# Patient Record
Sex: FEMALE | Race: WHITE | NOT HISPANIC OR LATINO | URBAN - METROPOLITAN AREA
[De-identification: names, ages, dates, MRNs, and addresses within clinical notes are randomized per-mention and may not be internally consistent; named-entity substitution may affect disease eponyms.]

---

## 2018-04-23 ENCOUNTER — INPATIENT (INPATIENT)
Facility: HOSPITAL | Age: 83
LOS: 9 days | Discharge: HOME | End: 2018-05-03
Attending: HOSPITALIST | Admitting: HOSPITALIST

## 2018-04-23 VITALS — HEART RATE: 98 BPM | RESPIRATION RATE: 19 BRPM | SYSTOLIC BLOOD PRESSURE: 66 MMHG | DIASTOLIC BLOOD PRESSURE: 43 MMHG

## 2018-04-23 LAB
ANION GAP SERPL CALC-SCNC: 22 MMOL/L — HIGH (ref 7–14)
APPEARANCE UR: (no result)
APTT BLD: 21.7 SEC — CRITICAL LOW (ref 27–39.2)
BASOPHILS # BLD AUTO: 0.01 K/UL — SIGNIFICANT CHANGE UP (ref 0–0.2)
BASOPHILS NFR BLD AUTO: 0.1 % — SIGNIFICANT CHANGE UP (ref 0–1)
BILIRUB DIRECT SERPL-MCNC: 0.3 MG/DL — HIGH (ref 0–0.2)
BILIRUB INDIRECT FLD-MCNC: 0.9 MG/DL — SIGNIFICANT CHANGE UP (ref 0.2–1.2)
BILIRUB SERPL-MCNC: 1.2 MG/DL — SIGNIFICANT CHANGE UP (ref 0.2–1.2)
BILIRUB UR-MCNC: NEGATIVE — SIGNIFICANT CHANGE UP
BUN SERPL-MCNC: 50 MG/DL — HIGH (ref 10–20)
CALCIUM SERPL-MCNC: 9.2 MG/DL — SIGNIFICANT CHANGE UP (ref 8.5–10.1)
CHLORIDE SERPL-SCNC: 101 MMOL/L — SIGNIFICANT CHANGE UP (ref 98–110)
CK SERPL-CCNC: 2478 U/L — HIGH (ref 0–225)
CO2 SERPL-SCNC: 20 MMOL/L — SIGNIFICANT CHANGE UP (ref 17–32)
COLOR SPEC: YELLOW — SIGNIFICANT CHANGE UP
CREAT SERPL-MCNC: 1 MG/DL — SIGNIFICANT CHANGE UP (ref 0.7–1.5)
DIFF PNL FLD: (no result)
EOSINOPHIL # BLD AUTO: 0 K/UL — SIGNIFICANT CHANGE UP (ref 0–0.7)
EOSINOPHIL NFR BLD AUTO: 0 % — SIGNIFICANT CHANGE UP (ref 0–8)
GAS PNL BLDV: SIGNIFICANT CHANGE UP
GLUCOSE SERPL-MCNC: 168 MG/DL — HIGH (ref 70–99)
GLUCOSE UR QL: NEGATIVE MG/DL — SIGNIFICANT CHANGE UP
HCT VFR BLD CALC: 31.9 % — LOW (ref 37–47)
HGB BLD-MCNC: 10.4 G/DL — LOW (ref 12–16)
IMM GRANULOCYTES NFR BLD AUTO: 0.7 % — HIGH (ref 0.1–0.3)
INR BLD: 1.34 RATIO — HIGH (ref 0.65–1.3)
KETONES UR-MCNC: 15
LEUKOCYTE ESTERASE UR-ACNC: (no result)
LIDOCAIN IGE QN: 7 U/L — SIGNIFICANT CHANGE UP (ref 7–60)
LYMPHOCYTES # BLD AUTO: 1.36 K/UL — SIGNIFICANT CHANGE UP (ref 1.2–3.4)
LYMPHOCYTES # BLD AUTO: 8.8 % — LOW (ref 20.5–51.1)
MAGNESIUM SERPL-MCNC: 2.5 MG/DL — HIGH (ref 1.8–2.4)
MCHC RBC-ENTMCNC: 30.1 PG — SIGNIFICANT CHANGE UP (ref 27–31)
MCHC RBC-ENTMCNC: 32.6 G/DL — SIGNIFICANT CHANGE UP (ref 32–37)
MCV RBC AUTO: 92.5 FL — SIGNIFICANT CHANGE UP (ref 81–99)
MONOCYTES # BLD AUTO: 1.03 K/UL — HIGH (ref 0.1–0.6)
MONOCYTES NFR BLD AUTO: 6.6 % — SIGNIFICANT CHANGE UP (ref 1.7–9.3)
NEUTROPHILS # BLD AUTO: 13 K/UL — HIGH (ref 1.4–6.5)
NEUTROPHILS NFR BLD AUTO: 83.8 % — HIGH (ref 42.2–75.2)
NITRITE UR-MCNC: POSITIVE
NT-PROBNP SERPL-SCNC: 8476 PG/ML — HIGH (ref 0–300)
PH UR: 6 — SIGNIFICANT CHANGE UP (ref 5–8)
PLATELET # BLD AUTO: 271 K/UL — SIGNIFICANT CHANGE UP (ref 130–400)
POTASSIUM SERPL-MCNC: 5.5 MMOL/L — HIGH (ref 3.5–5)
POTASSIUM SERPL-SCNC: 5.5 MMOL/L — HIGH (ref 3.5–5)
PROT UR-MCNC: 30 MG/DL
PROTHROM AB SERPL-ACNC: 14.6 SEC — HIGH (ref 9.95–12.87)
RBC # BLD: 3.45 M/UL — LOW (ref 4.2–5.4)
RBC # FLD: 14.6 % — HIGH (ref 11.5–14.5)
SODIUM SERPL-SCNC: 143 MMOL/L — SIGNIFICANT CHANGE UP (ref 135–146)
SP GR SPEC: 1.01 — SIGNIFICANT CHANGE UP (ref 1.01–1.03)
TROPONIN T SERPL-MCNC: <0.01 NG/ML — SIGNIFICANT CHANGE UP
TYPE + AB SCN PNL BLD: SIGNIFICANT CHANGE UP
UROBILINOGEN FLD QL: 0.2 MG/DL — SIGNIFICANT CHANGE UP (ref 0.2–0.2)
WBC # BLD: 15.51 K/UL — HIGH (ref 4.8–10.8)
WBC # FLD AUTO: 15.51 K/UL — HIGH (ref 4.8–10.8)

## 2018-04-23 RX ORDER — MAGNESIUM SULFATE 500 MG/ML
2 VIAL (ML) INJECTION ONCE
Qty: 0 | Refills: 0 | Status: COMPLETED | OUTPATIENT
Start: 2018-04-23 | End: 2018-04-23

## 2018-04-23 RX ORDER — NOREPINEPHRINE BITARTRATE/D5W 8 MG/250ML
0.05 PLASTIC BAG, INJECTION (ML) INTRAVENOUS
Qty: 8 | Refills: 0 | Status: DISCONTINUED | OUTPATIENT
Start: 2018-04-23 | End: 2018-04-24

## 2018-04-23 RX ORDER — SODIUM CHLORIDE 9 MG/ML
1000 INJECTION, SOLUTION INTRAVENOUS ONCE
Qty: 0 | Refills: 0 | Status: COMPLETED | OUTPATIENT
Start: 2018-04-23 | End: 2018-04-23

## 2018-04-23 RX ORDER — CEFTRIAXONE 500 MG/1
1 INJECTION, POWDER, FOR SOLUTION INTRAMUSCULAR; INTRAVENOUS ONCE
Qty: 0 | Refills: 0 | Status: COMPLETED | OUTPATIENT
Start: 2018-04-23 | End: 2018-04-23

## 2018-04-23 RX ORDER — METOPROLOL TARTRATE 50 MG
12.5 TABLET ORAL
Qty: 0 | Refills: 0 | Status: DISCONTINUED | OUTPATIENT
Start: 2018-04-23 | End: 2018-04-27

## 2018-04-23 RX ORDER — SODIUM CHLORIDE 9 MG/ML
1000 INJECTION, SOLUTION INTRAVENOUS
Qty: 0 | Refills: 0 | Status: DISCONTINUED | OUTPATIENT
Start: 2018-04-23 | End: 2018-04-25

## 2018-04-23 RX ORDER — SODIUM CHLORIDE 9 MG/ML
1000 INJECTION INTRAMUSCULAR; INTRAVENOUS; SUBCUTANEOUS ONCE
Qty: 0 | Refills: 0 | Status: COMPLETED | OUTPATIENT
Start: 2018-04-23 | End: 2018-04-23

## 2018-04-23 RX ADMIN — SODIUM CHLORIDE 6000 MILLILITER(S): 9 INJECTION, SOLUTION INTRAVENOUS at 13:36

## 2018-04-23 RX ADMIN — SODIUM CHLORIDE 100 MILLILITER(S): 9 INJECTION, SOLUTION INTRAVENOUS at 14:50

## 2018-04-23 RX ADMIN — Medication 50 GRAM(S): at 12:32

## 2018-04-23 RX ADMIN — Medication 5.62 MICROGRAM(S)/KG/MIN: at 21:25

## 2018-04-23 RX ADMIN — CEFTRIAXONE 100 GRAM(S): 500 INJECTION, POWDER, FOR SOLUTION INTRAMUSCULAR; INTRAVENOUS at 15:59

## 2018-04-23 RX ADMIN — SODIUM CHLORIDE 6000 MILLILITER(S): 9 INJECTION, SOLUTION INTRAVENOUS at 14:50

## 2018-04-23 RX ADMIN — Medication 5.62 MICROGRAM(S)/KG/MIN: at 19:32

## 2018-04-23 RX ADMIN — SODIUM CHLORIDE 1000 MILLILITER(S): 9 INJECTION, SOLUTION INTRAVENOUS at 21:15

## 2018-04-23 RX ADMIN — SODIUM CHLORIDE 2000 MILLILITER(S): 9 INJECTION INTRAMUSCULAR; INTRAVENOUS; SUBCUTANEOUS at 12:31

## 2018-04-23 RX ADMIN — Medication 5.62 MICROGRAM(S)/KG/MIN: at 14:50

## 2018-04-23 NOTE — CONSULT NOTE ADULT - SUBJECTIVE AND OBJECTIVE BOX
84 y o F presents with right hip fx. Pt was found unresponsive on the floor today, brought to ER, hypothermic, hypotensive on arrival to ER. Last time family contacted pt 3 days ago on friday- pt was well. Pt was walking with cane at baseline.     PAST MEDICAL & SURGICAL HISTORY:  HTN (hypertension)    MEDICATIONS  (STANDING):  lactated ringers Bolus 1000 milliLiter(s) IV Bolus once  lactated ringers. 1000 milliLiter(s) (100 mL/Hr) IV Continuous <Continuous>  norepinephrine Infusion 0.05 MICROgram(s)/kG/Min (5.625 mL/Hr) IV Continuous <Continuous>    MEDICATIONS  (PRN):    Allergies    No Known Allergies    Intolerances    Pt seen and examined , awake, alert    Vital Signs Last 24 Hrs  T(C): 34.9 (23 Apr 2018 20:30), Max: 34.9 (23 Apr 2018 20:30)  T(F): 94.9 (23 Apr 2018 20:30), Max: 94.9 (23 Apr 2018 20:30)  HR: 114 (23 Apr 2018 20:30) (78 - 114)  BP: 110/59 (23 Apr 2018 20:30) (66/40 - 144/85)  BP(mean): --  RR: 18 (23 Apr 2018 20:30) (18 - 19)  SpO2: 100% (23 Apr 2018 20:30) (97% - 100%)      PE- RUE externally rotated, ttp right groin, pain on leg rolling.   Right ankle, toes- motor function intact, sensation grossly intakt  Foot well perfused, + pulses                          10.4   15.51 )-----------( 271      ( 23 Apr 2018 12:08 )             31.9     04-23    143  |  101  |  50<H>  ----------------------------<  168<H>  5.5<H>   |  20  |  1.0    Ca    9.2      23 Apr 2018 12:08  Mg     2.5     04-23    TPro  x   /  Alb  x   /  TBili  1.2  /  DBili  0.3<H>  /  AST  x   /  ALT  x   /  AlkPhos  x   04-23          PT/INR - ( 23 Apr 2018 12:08 )   PT: 14.60 sec;   INR: 1.34 ratio         PTT - ( 23 Apr 2018 12:08 )  PTT:21.7 sec    < from: CT Abdomen and Pelvis w/ IV Cont (04.23.18 @ 14:31) >  BONES/SOFT TISSUES: Acute, comminuted right intratrochanteric right   femoral fracture.    < end of copied text >  < from: CT Abdomen and Pelvis w/ IV Cont (04.23.18 @ 14:31) >  BONES/SOFT TISSUES: Acute, comminuted right intratrochanteric right   femoral fracture.    < end of copied text > 84 y o F presents with right hip fx. Pt was found unresponsive on the floor today, brought to ER, hypothermic, hypotensive on arrival to ER. Last time family contacted pt 3 days ago on friday- pt was well. Pt was walking with cane at baseline.     PAST MEDICAL & SURGICAL HISTORY:  HTN (hypertension)    MEDICATIONS  (STANDING):  lactated ringers Bolus 1000 milliLiter(s) IV Bolus once  lactated ringers. 1000 milliLiter(s) (100 mL/Hr) IV Continuous <Continuous>  norepinephrine Infusion 0.05 MICROgram(s)/kG/Min (5.625 mL/Hr) IV Continuous <Continuous>    MEDICATIONS  (PRN):    Allergies: No Known Allergies    Intolerances    Pt seen and examined , awake, alert    Vital Signs Last 24 Hrs  T(C): 34.9 (23 Apr 2018 20:30), Max: 34.9 (23 Apr 2018 20:30)  T(F): 94.9 (23 Apr 2018 20:30), Max: 94.9 (23 Apr 2018 20:30)  HR: 114 (23 Apr 2018 20:30) (78 - 114)  BP: 110/59 (23 Apr 2018 20:30) (66/40 - 144/85)  BP(mean): --  RR: 18 (23 Apr 2018 20:30) (18 - 19)  SpO2: 100% (23 Apr 2018 20:30) (97% - 100%)      PE- RUE externally rotated, ttp right groin, pain on leg rolling.   Right ankle, toes- motor function intact, sensation grossly intakt  Foot well perfused, + pulses                          10.4   15.51 )-----------( 271      ( 23 Apr 2018 12:08 )             31.9     04-23    143  |  101  |  50<H>  ----------------------------<  168<H>  5.5<H>   |  20  |  1.0    Ca    9.2      23 Apr 2018 12:08  Mg     2.5     04-23    TPro  x   /  Alb  x   /  TBili  1.2  /  DBili  0.3<H>  /  AST  x   /  ALT  x   /  AlkPhos  x   04-23          PT/INR - ( 23 Apr 2018 12:08 )   PT: 14.60 sec;   INR: 1.34 ratio         PTT - ( 23 Apr 2018 12:08 )  PTT:21.7 sec    < from: CT Abdomen and Pelvis w/ IV Cont (04.23.18 @ 14:31) >  BONES/SOFT TISSUES: Acute, comminuted right intratrochanteric right   femoral fracture.    < end of copied text >  < from: CT Abdomen and Pelvis w/ IV Cont (04.23.18 @ 14:31) >  BONES/SOFT TISSUES: Acute, comminuted right intratrochanteric right   femoral fracture.    < end of copied text >

## 2018-04-23 NOTE — PROGRESS NOTE ADULT - SUBJECTIVE AND OBJECTIVE BOX
85 yo female with PMH of HTN, macular degeneration found unconscious by family member. History per daughter, daughter in law, and son in law. Patient was last spoken to 83 yo female with PMH of HTN, macular degeneration found unconscious by family member. History per daughter, daughter in law, and son in law. Patient was last spoken to over the phone 18, on  there was no response, on  family member went to the house no one answered. Today  again family member went to the house and found patient on the floor wearing her pant and bra her shirt soaked next to her and unresponsive, it seemed to family member she was cleaning when the episode occured. Her lips noted to be blue along with her elbows and her hands.  EMS immediately was called and patient brought to the ED.  in the ED patient was as found to be in atrial fibrillation, hypothermic  at 80F, unresponsive and moaning. patient trauma w/u done revealing left 9th rib fracture, right intertrochanteric fracture, and age indeterminate t6-L2 compression fracture. patient was given IV fluid (total of 5 Liters, 4liter LR+1 Liter NS) placed on norepinephrine for a short period of time due to hypotension (bp 60's/40's). As her temperature improved, her mental status has improved as well.   patient denies any recent fevers, chills, nausea, vomiting diarrhea, constipation, dysuria, cough, chest pain, palpitations. Endorses having headaches in the past the most recent being on friday. Patient is unsure of events surrounding her incident, denies any previous episodes of syncope.    At baseline patient lives by herself, completes ADL's on her own. uses a cane when ambulating out of the house.    As per family (privately away from patient) states that over the past year patient has had multiple episodes of syncope. the latest episode being 1 month ago, and per daughter each episode has been lasting progressively longer. the last one being a few minutes. States that the episodes occur when the patient is "worked up about something", and per daughter the days prior to this syncopal event patient was agitated and "worked up" about something.    PSH  ankle surgery ()    SH  nonsmoker (smoked in the past as a teen)  no etoh use  no drug use    allergy  none    Home meds  Diovan 320 mg q24h            Radiology  < from: CT Abdomen and Pelvis w/ IV Cont + ct chest with contrast (18 @ 14:31) >  1. Acute, comminuted right intratrochanteric right femoral fracture.    2.  Acute, minimally displaced left posterior 9th rib fracture; no   pneumothorax. Respiratory motion limits evaluation for additional rib   fractures.     3. Age-indeterminate T6-L2 vertebral body compression deformities.     4. Enlarged, partially calcified left thyroid lobe/nodule. Consider   outpatient ultrasound.    5. No evidence of solid intra-abdominal organ injury.    < end of copied text >    < from: CT Cervical Spine No Cont (18 @ 14:28) >  Osteopenia without acute fracture or prevertebral soft tissue swelling.    Degenerative changes as above.    < end of copied text >    < from: CT Head No Cont (18 @ 14:26) >  No mass effect or intracranial hemorrhage.     Chronic microvascular ischemic changes.    < end of copied text >    Allergies    No Known Allergies    Intolerances        T(F): 94.9 (18 @ 20:30), Max: 94.9 (18 @ 20:30)  HR: 114 (18 @ 20:30) (78 - 114)  BP: 110/59 (18 @ 20:30) (66/40 - 144/85)  BP(mean): --  RR: 18 (18 @ 20:30) (18 - 19)  SpO2: 100% (18 @ 20:30) (97% - 100%)        143  |  101  |  50<H>  ----------------------------<  168<H>  5.5<H>   |  20  |  1.0    Ca    9.2      2018 12:08  Mg     2.5         TPro  x   /  Alb  x   /  TBili  1.2  /  DBili  0.3<H>  /  AST  x   /  ALT  x   /  AlkPhos  x                               10.4   15.51 )-----------( 271      ( 2018 12:08 )             31.9             Urinalysis Basic - ( 2018 12:34 )    Color: Yellow / Appearance: Cloudy / S.015 / pH: x  Gluc: x / Ketone: 15  / Bili: Negative / Urobili: 0.2 mg/dL   Blood: x / Protein: 30 mg/dL / Nitrite: Positive   Leuk Esterase: Small / RBC: 5-10 /HPF / WBC 3-5 /HPF   Sq Epi: x / Non Sq Epi: Occasional /HPF / Bacteria: Moderate /HPF          Urinalysis Basic - ( 2018 12:34 )    Color: Yellow / Appearance: Cloudy / S.015 / pH: x  Gluc: x / Ketone: 15  / Bili: Negative / Urobili: 0.2 mg/dL   Blood: x / Protein: 30 mg/dL / Nitrite: Positive   Leuk Esterase: Small / RBC: 5-10 /HPF / WBC 3-5 /HPF   Sq Epi: x / Non Sq Epi: Occasional /HPF / Bacteria: Moderate /HPF        PT/INR - ( 2018 12:08 )   PT: 14.60 sec;   INR: 1.34 ratio         PTT - ( 2018 12:08 )  PTT:21.7 sec      CARDIAC MARKERS ( 2018 12:08 )  x     / <0.01 ng/mL / 2478 U/L / x     / x 83 yo female with PMH of HTN, macular degeneration found unconscious by family member. History per daughter, daughter in law, and son in law. Patient was last spoken to over the phone 18, on  there was no response, on  family member went to the house no one answered. Today  again family member went to the house and found patient on the floor wearing her pant and bra her shirt soaked next to her and unresponsive, it seemed to family member she was cleaning when the episode occured. Her lips noted to be blue along with her elbows and her hands.  EMS immediately was called and patient brought to the ED.  in the ED patient was as found to be in atrial fibrillation, hypothermic  at 80F, unresponsive and moaning. patient trauma w/u done revealing left 9th rib fracture, right intertrochanteric fracture, and age indeterminate t6-L2 compression fracture. patient was given IV fluid (total of 5 Liters, 4liter LR+1 Liter NS) placed on norepinephrine for a short period of time due to hypotension (bp 60's/40's). As her temperature improved, her mental status has improved as well.   patient denies any recent fevers, chills, nausea, vomiting diarrhea, constipation, dysuria, cough, chest pain, palpitations. Endorses having headaches in the past the most recent being on friday. Patient is unsure of events surrounding her incident, denies any previous episodes of syncope.    At baseline patient lives by herself, completes ADL's on her own. uses a cane when ambulating out of the house.    As per family (privately away from patient) states that over the past year patient has had multiple episodes of syncope. the latest episode being 1 month ago, and per daughter each episode has been lasting progressively longer. the last one being a few minutes. States that the episodes occur when the patient is "worked up about something", and per daughter the days prior to this syncopal event patient was agitated and "worked up" about something.    PSH  ankle surgery ()    SH  nonsmoker (smoked in the past as a teen)  no etoh use  no drug use    allergy  none    Home meds  Diovan 320 mg q24h            Radiology  < from: CT Abdomen and Pelvis w/ IV Cont + ct chest with contrast (18 @ 14:31) >  1. Acute, comminuted right intratrochanteric right femoral fracture.    2.  Acute, minimally displaced left posterior 9th rib fracture; no   pneumothorax. Respiratory motion limits evaluation for additional rib   fractures.     3. Age-indeterminate T6-L2 vertebral body compression deformities.     4. Enlarged, partially calcified left thyroid lobe/nodule. Consider   outpatient ultrasound.    5. No evidence of solid intra-abdominal organ injury.    < end of copied text >    < from: CT Cervical Spine No Cont (18 @ 14:28) >  Osteopenia without acute fracture or prevertebral soft tissue swelling.    Degenerative changes as above.    < end of copied text >    < from: CT Head No Cont (18 @ 14:26) >  No mass effect or intracranial hemorrhage.     Chronic microvascular ischemic changes.    < end of copied text >    Allergies    No Known Allergies    Intolerances        T(F): 94.9 (18 @ 20:30), Max: 94.9 (18 @ 20:30)  HR: 114 (18 @ 20:30) (78 - 114)  BP: 110/59 (18 @ 20:30) (66/40 - 144/85)  BP(mean): --  RR: 18 (18 @ 20:30) (18 - 19)  SpO2: 100% (18 @ 20:30) (97% - 100%)        143  |  101  |  50<H>  ----------------------------<  168<H>  5.5<H>   |  20  |  1.0    Ca    9.2      2018 12:08  Mg     2.5         TPro  x   /  Alb  x   /  TBili  1.2  /  DBili  0.3<H>  /  AST  x   /  ALT  x   /  AlkPhos  x                               10.4   15.51 )-----------( 271      ( 2018 12:08 )             31.9             Urinalysis Basic - ( 2018 12:34 )    Color: Yellow / Appearance: Cloudy / S.015 / pH: x  Gluc: x / Ketone: 15  / Bili: Negative / Urobili: 0.2 mg/dL   Blood: x / Protein: 30 mg/dL / Nitrite: Positive   Leuk Esterase: Small / RBC: 5-10 /HPF / WBC 3-5 /HPF   Sq Epi: x / Non Sq Epi: Occasional /HPF / Bacteria: Moderate /HPF          Urinalysis Basic - ( 2018 12:34 )    Color: Yellow / Appearance: Cloudy / S.015 / pH: x  Gluc: x / Ketone: 15  / Bili: Negative / Urobili: 0.2 mg/dL   Blood: x / Protein: 30 mg/dL / Nitrite: Positive   Leuk Esterase: Small / RBC: 5-10 /HPF / WBC 3-5 /HPF   Sq Epi: x / Non Sq Epi: Occasional /HPF / Bacteria: Moderate /HPF        PT/INR - ( 2018 12:08 )   PT: 14.60 sec;   INR: 1.34 ratio         PTT - ( 2018 12:08 )  PTT:21.7 sec    PHYSICAL EXAM:  GENERAL: NAD, speaks in full sentences, no signs of respiratory distress  HEAD:  Atraumatic, Normocephalic  EYES: vertical gaze palsy, PERRL, conjunctiva and sclera clear  NECK: Supple, No JVD  CHEST/LUNG: Clear to auscultation bilaterally; No wheeze; No crackles; No accessory muscles used  HEART: Irregular rhythm, tachycardic; No murmurs;   ABDOMEN: Soft, Nontender, Nondistended; Bowel sounds present; No guarding  EXTREMITIES:  2+ Peripheral Pulses, No cyanosis or edema  PSYCH: AAOx2 (oriented to person and place)  NEUROLOGY: non-focal, sensation intact throughout  SKIN: No rashes or lesions      CARDIAC MARKERS ( 2018 12:08 )  x     / <0.01 ng/mL / 2478 U/L / x     / x

## 2018-04-23 NOTE — ED PROVIDER NOTE - CARE PLAN
Principal Discharge DX:	Hypothermia, initial encounter  Secondary Diagnosis:	Hip fracture  Secondary Diagnosis:	Urinary tract infection

## 2018-04-23 NOTE — ED PROVIDER NOTE - CRITICAL CARE PROVIDED
conducted a detailed discussion of DNR status/documentation/additional history taking/consult w/ pt's family directly relating to pts condition/direct patient care (not related to procedure)/interpretation of diagnostic studies

## 2018-04-23 NOTE — ED ADULT NURSE NOTE - OBJECTIVE STATEMENT
Pt brought in by EMS for AMS. Pt family said they were not able to get I touch with pt since friday. Pt was found on floor with AMS.

## 2018-04-23 NOTE — ED PROVIDER NOTE - PROGRESS NOTE DETAILS
history obtained from son in law at [facility], also spoke w/ daughter who is currently hospitalized upstairs. Per family pt is DNR/DNI and would not want heroic efforts. Another family member is bringing her paperwork to the ED. pt is hypotensive despite ongoing fluid resuscitation, son in law to discuss pressors w/ family and advise if they would like care escalated. Pt DNR  / DNI as per family at bedside, pt GCS is 8 (E-2, S-1,M5), hypotensive, pt family currently declining pressors, bolus started with warm iv fluids, kelly hugger and bladder temp in place. T 81 F via bladder probe, will continue rewarming  sys s/p 4 liters IVF ATTENDING NOTE: 84-year-old female h/o HTN and CAD, lives at home alone presents with altered mental mental status today.  Family states that they last spoke to her on Friday at which time she was alert and oriented.  Today when they were unable to contact her by telephone they went to her house and found her unresponsive on the floor.  No further history available as patient is unresponsive.  Head NC / NT, PERRL / EOMI, no hemotympanum, no dental injury, no abrasions or lacerations, chest CTAB, chest wall NT, no w/r/r, +S1/S2, RRR, no m/r/g, abdomen soft, NT, ND, +BS, eyes open, no respnding to commands, withdraws to pain.    Due to the need for continuous patient care in the emergency department, the times documented are the time of computer entry, not necessarily the one the event occurred.  sys pt clear for medical admission per trauma, request ortho consult for hip fx. pt approved for ICU by esha lee attending. spoke w/ ortho, kiran núñez pt.

## 2018-04-23 NOTE — ED PROVIDER NOTE - OBJECTIVE STATEMENT
85 y/o F w/ pmh of HTN and "heart problems" per family BIBA unresponsive. Pt last spoke w/ daughter on Friday and had not been feeling well for the past week. Family unable to contact her over the weekend until her son in law went to her home this morning and entered through and unlocked door. He found pt unresponsive on the floor of her basement in a pile of debris. No obvious signs of trauma or fall per EMS, pt was laying far away from the stairs.

## 2018-04-23 NOTE — H&P ADULT - NSHPPHYSICALEXAM_GEN_ALL_CORE
Vital Signs Last 24 Hrs  T(F): 86.2 (23 Apr 2018 16:40), Max: 86.2 (23 Apr 2018 16:40)  HR: 104 (23 Apr 2018 16:40) (78 - 104)  BP: 136/64 (23 Apr 2018 16:40) (66/40 - 144/85)  RR: 18 (23 Apr 2018 16:40) (18 - 19)  SpO2: 99% (23 Apr 2018 16:40) (97% - 100%)      GEN- awake, not alert, on nonrebreather  HEENT- normocephalic, atraumatic  CV- S1, S2, irregular RR  LUNGS- b/l BS  ABD/PEL- soft, ND/NT  EXT- +right hip deformity/swelling

## 2018-04-23 NOTE — ED PROVIDER NOTE - ATTENDING CONTRIBUTION TO CARE
I personally evaluated the patient. I reviewed the resident’s note (as assigned above), and agree with the findings and plan except as documented in my note.

## 2018-04-23 NOTE — H&P ADULT - HISTORY OF PRESENT ILLNESS
84 year old female, found down by family members last seen/spoken to 2 days prior, unknown down time. Brought in by EMS. Pt DNR/DNI. On nonrebreather and warming blankets in ED, in Afib on the monitor, per family no history of Afib. PMHX includes HTN. Pt. Found to have right intertrochanteric fracture and a left 9th rib fracture on imaging. Patient also found to have CK of 2400 and +UTI with WBC of 15. Pt. seen and examined.

## 2018-04-23 NOTE — PROGRESS NOTE ADULT - ASSESSMENT
85 yo female with h/o syncopal episodes (denied by patient) p/w syncopal episode found, unconscious by family, last known well ~48 hours prior.     # Syncope 2/2 vasovagal vs cardiac arrythmia  - admit to ICU  - CTH negative  -cardiac enzymes negative x 1  - check 2more sets of enzymes  - 2decho  -  pan culture  -UA nitrite positive, 5-10wbc, patient denies any urinary symptoms, was given rocephin in the ED. will hold on giving antibiotics    # severe hypothermia  -check tsh, T4 level  -check AM cortisol  -warming blanket  check temp per ICU protocol    # new onset atrial fibrillation  chadsvasc- 2  b-blocker if bp- tolerates for rate control  patient is high risk for anticoagulation given history of multiple falls    # htn  hold bp meds in leiu of recent hypotension     #rhabdomyolysis (Ck 9498)  c/w IVF- LR @ 100/hr    #hyperkalemia likley 2/2 rhabdo and hypothermia  - monitored  ace inhibitor held  - check BMP 1130 pm    #intertrochanteric fracture  - ortho evaluation  monitor H/H    dvt ppx 83 yo female with h/o syncopal episodes (denied by patient) p/w syncopal episode found, unconscious by family, last known well ~48 hours prior.     # Syncope 2/2 vasovagal vs cardiac arrythmia  - admit to ICU  - CTH negative  -cardiac enzymes negative x 1  - check 2more sets of enzymes  - 2decho  -  pan culture  -UA nitrite positive, 5-10wbc, patient denies any urinary symptoms, was given rocephin in the ED. will hold on giving antibiotics    # severe hypothermia  -check tsh, T4 level  -check AM cortisol  -warming blanket  check temp per ICU protocol    # new onset atrial fibrillation  chadsvasc- 2  b-blocker if bp- tolerates for rate control  patient is high risk for anticoagulation given history of multiple falls    # htn  hold bp meds in leiu of recent hypotension     #rhabdomyolysis (Ck 7336)  c/w IVF- LR @ 100/hr    #hyperkalemia likely 2/2 rhabdo and hypothermia  - monitored  ace inhibitor held  - check BMP 1130 pm    #intertrochanteric fracture  - ortho evaluation- pelvis, right hip, right femur xray. ORIF when more stable  pain control, oxycodone 10mg q4h prn  monitor H/H  bedrest    dvt ppx

## 2018-04-23 NOTE — CONSULT NOTE ADULT - ASSESSMENT
84 y o F with Right IT fx, hypothermia, admitted to ICU    -pelvis, right hip, right femur xrays  -pt will need right hip orif when stable  -pain control  -NWB RLE  - will d/w att- will follow

## 2018-04-23 NOTE — ED PROVIDER NOTE - PHYSICAL EXAMINATION
Elderly and disheveled, feves, urine, dirt and salt like substance on skin. Moans weakly to painful stimulus. Skin  cold and dry w/ scattered bruises on back and abdomen, pressure 2x2cm ulcer over mid back. Head normocephalic, atraumatic. Pupils 4mm and sluggish, conjunctiva and sclera clear. MM dry, no nasal discharge.  Neck supple. Heart RRR s1s2 nl, no rub/murmur. Lungs- No retractions, BS equal, CTAB. Abdomen soft ntnd no r/g. Extremities- moves weakly. +distal pulses. No LE edema. Elderly and disheveled, feces, urine, dirt and salt like substance on skin. Moans weakly to painful stimulus. Skin  cold and dry w/ scattered bruises on back and abdomen, pressure 2x2cm ulcer over mid back. Head normocephalic, atraumatic. Pupils 4mm and sluggish, conjunctiva and sclera clear. MM dry, no nasal discharge.  Neck supple. Heart RRR s1s2 nl, no rub/murmur. Lungs- No retractions, BS equal, CTAB. Abdomen soft ntnd no r/g. Extremities- moves weakly. +distal pulses. No LE edema.

## 2018-04-23 NOTE — H&P ADULT - ASSESSMENT
84 year old female DNR/DNI s/p found down for unknown period of time, last seen 2 days prior. Patient with multiple comorbities/injuries including right intertrochanteric fracture, left 9th rib fracture, new onset a. fib, hypothermia, UTI, elevated CK levels.     1. Right intertrochanteric fracture   - orthopedic consult for reduction/splint  - family to decide if they would want any operative fixation    2. Left 9th rib fracture   -does not correlate with any tenderness or contribute to the medical condition of the patient  -no further intervention    3. UTI   -antibiotics & culture    4. elevated CK levels  - all compartments soft, trend serial CKs, IVF for hydration, strict I&Os 84 year old female DNR/DNI s/p found down for unknown period of time, last seen 2 days prior. Patient with multiple comorbities/injuries including right intertrochanteric fracture, left 9th rib fracture, new onset a. fib, hypothermia, UTI, elevated CK levels.     1. Right intertrochanteric fracture   - orthopedic consult for reduction/splint  - family to decide if they would want any operative fixation    2. Left 9th rib fracture   -does not correlate with any tenderness or contribute to the medical condition of the patient  -no further intervention    3. UTI   -antibiotics & culture    4. elevated CK levels  - all compartments soft, trend serial CKs, IVF for hydration, strict I&Os  senior resident on call note : agree on above paln discussed with dr soto and he agree on plan too:  84 fs/p fal,l , found down with rt ITFX ,lr rib fx minimal clinical rel;bant to patient condition  , no compartment syndrome appreciated for now :  followe plan above

## 2018-04-23 NOTE — H&P ADULT - NSHPLABSRESULTS_GEN_ALL_CORE
10.4   15.51 )-----------( 271      ( 2018 12:08 )             31.9         143  |  101  |  50<H>  ----------------------------<  168<H>  5.5<H>   |  20  |  1.0    Ca    9.2      2018 12:08  Mg     2.5         TPro  x   /  Alb  x   /  TBili  1.2  /  DBili  0.3<H>  /  AST  x   /  ALT  x   /  AlkPhos  x       PT/INR - ( 2018 12:08 )   PT: 14.60 sec;   INR: 1.34 ratio         PTT - ( 2018 12:08 )  PTT:21.7 sec      Urinalysis Basic - ( 2018 12:34 )    Color: Yellow / Appearance: Cloudy / S.015 / pH: x  Gluc: x / Ketone: 15  / Bili: Negative / Urobili: 0.2 mg/dL   Blood: x / Protein: 30 mg/dL / Nitrite: Positive   Leuk Esterase: Small / RBC: 5-10 /HPF / WBC 3-5 /HPF   Sq Epi: x / Non Sq Epi: Occasional /HPF / Bacteria: Moderate /HPF      CARDIAC MARKERS ( 2018 12:08 )  x     / <0.01 ng/mL / 2478 U/L / x     / x

## 2018-04-24 LAB
ALBUMIN SERPL ELPH-MCNC: 2.9 G/DL — LOW (ref 3.5–5.2)
ALBUMIN SERPL ELPH-MCNC: 2.9 G/DL — LOW (ref 3.5–5.2)
ALP SERPL-CCNC: 63 U/L — SIGNIFICANT CHANGE UP (ref 30–115)
ALP SERPL-CCNC: 65 U/L — SIGNIFICANT CHANGE UP (ref 30–115)
ALT FLD-CCNC: 76 U/L — HIGH (ref 0–41)
ALT FLD-CCNC: 77 U/L — HIGH (ref 0–41)
ANION GAP SERPL CALC-SCNC: 18 MMOL/L — HIGH (ref 7–14)
ANION GAP SERPL CALC-SCNC: 18 MMOL/L — HIGH (ref 7–14)
ANION GAP SERPL CALC-SCNC: 20 MMOL/L — HIGH (ref 7–14)
APPEARANCE UR: CLEAR — SIGNIFICANT CHANGE UP
APTT BLD: 19.5 SEC — CRITICAL LOW (ref 27–39.2)
AST SERPL-CCNC: 149 U/L — HIGH (ref 0–41)
AST SERPL-CCNC: 157 U/L — HIGH (ref 0–41)
BASOPHILS # BLD AUTO: 0.01 K/UL — SIGNIFICANT CHANGE UP (ref 0–0.2)
BASOPHILS NFR BLD AUTO: 0.1 % — SIGNIFICANT CHANGE UP (ref 0–1)
BILIRUB DIRECT SERPL-MCNC: 0.2 MG/DL — SIGNIFICANT CHANGE UP (ref 0–0.2)
BILIRUB INDIRECT FLD-MCNC: 0.6 MG/DL — SIGNIFICANT CHANGE UP (ref 0.2–1.2)
BILIRUB SERPL-MCNC: 0.7 MG/DL — SIGNIFICANT CHANGE UP (ref 0.2–1.2)
BILIRUB SERPL-MCNC: 0.8 MG/DL — SIGNIFICANT CHANGE UP (ref 0.2–1.2)
BILIRUB UR-MCNC: (no result)
BUN SERPL-MCNC: 41 MG/DL — HIGH (ref 10–20)
BUN SERPL-MCNC: 42 MG/DL — HIGH (ref 10–20)
BUN SERPL-MCNC: 42 MG/DL — HIGH (ref 10–20)
CALCIUM SERPL-MCNC: 7.9 MG/DL — LOW (ref 8.5–10.1)
CALCIUM SERPL-MCNC: 8 MG/DL — LOW (ref 8.5–10.1)
CALCIUM SERPL-MCNC: 8 MG/DL — LOW (ref 8.5–10.1)
CHLORIDE SERPL-SCNC: 101 MMOL/L — SIGNIFICANT CHANGE UP (ref 98–110)
CHLORIDE SERPL-SCNC: 103 MMOL/L — SIGNIFICANT CHANGE UP (ref 98–110)
CHLORIDE SERPL-SCNC: 105 MMOL/L — SIGNIFICANT CHANGE UP (ref 98–110)
CK SERPL-CCNC: 1891 U/L — HIGH (ref 0–225)
CK SERPL-CCNC: >2000 U/L — HIGH (ref 0–225)
CO2 SERPL-SCNC: 16 MMOL/L — LOW (ref 17–32)
CO2 SERPL-SCNC: 19 MMOL/L — SIGNIFICANT CHANGE UP (ref 17–32)
CO2 SERPL-SCNC: 20 MMOL/L — SIGNIFICANT CHANGE UP (ref 17–32)
COLOR SPEC: YELLOW — SIGNIFICANT CHANGE UP
CREAT SERPL-MCNC: 0.7 MG/DL — SIGNIFICANT CHANGE UP (ref 0.7–1.5)
CREAT SERPL-MCNC: 0.8 MG/DL — SIGNIFICANT CHANGE UP (ref 0.7–1.5)
CREAT SERPL-MCNC: 0.8 MG/DL — SIGNIFICANT CHANGE UP (ref 0.7–1.5)
DIFF PNL FLD: (no result)
EOSINOPHIL # BLD AUTO: 0 K/UL — SIGNIFICANT CHANGE UP (ref 0–0.7)
EOSINOPHIL NFR BLD AUTO: 0 % — SIGNIFICANT CHANGE UP (ref 0–8)
EPI CELLS # UR: (no result) /HPF
GLUCOSE SERPL-MCNC: 60 MG/DL — LOW (ref 70–99)
GLUCOSE SERPL-MCNC: 60 MG/DL — LOW (ref 70–99)
GLUCOSE SERPL-MCNC: 71 MG/DL — SIGNIFICANT CHANGE UP (ref 70–99)
GLUCOSE UR QL: NEGATIVE — SIGNIFICANT CHANGE UP
HCT VFR BLD CALC: 24.9 % — LOW (ref 37–47)
HCT VFR BLD CALC: 26.8 % — LOW (ref 37–47)
HCT VFR BLD CALC: 27.7 % — LOW (ref 37–47)
HGB BLD-MCNC: 8 G/DL — LOW (ref 12–16)
HGB BLD-MCNC: 8.9 G/DL — LOW (ref 12–16)
HGB BLD-MCNC: 9.2 G/DL — LOW (ref 12–16)
IMM GRANULOCYTES NFR BLD AUTO: 0.4 % — HIGH (ref 0.1–0.3)
IMM GRANULOCYTES NFR BLD AUTO: 0.5 % — HIGH (ref 0.1–0.3)
IMM GRANULOCYTES NFR BLD AUTO: 0.5 % — HIGH (ref 0.1–0.3)
IRON SATN MFR SERPL: 13 % — LOW (ref 15–50)
IRON SATN MFR SERPL: 24 UG/DL — LOW (ref 35–150)
KETONES UR-MCNC: 40
LEUKOCYTE ESTERASE UR-ACNC: (no result)
LYMPHOCYTES # BLD AUTO: 0.48 K/UL — LOW (ref 1.2–3.4)
LYMPHOCYTES # BLD AUTO: 0.5 K/UL — LOW (ref 1.2–3.4)
LYMPHOCYTES # BLD AUTO: 0.78 K/UL — LOW (ref 1.2–3.4)
LYMPHOCYTES # BLD AUTO: 3.7 % — LOW (ref 20.5–51.1)
LYMPHOCYTES # BLD AUTO: 3.9 % — LOW (ref 20.5–51.1)
LYMPHOCYTES # BLD AUTO: 5.6 % — LOW (ref 20.5–51.1)
MAGNESIUM SERPL-MCNC: 2.2 MG/DL — SIGNIFICANT CHANGE UP (ref 1.8–2.4)
MAGNESIUM SERPL-MCNC: 2.2 MG/DL — SIGNIFICANT CHANGE UP (ref 1.8–2.4)
MCHC RBC-ENTMCNC: 30.1 PG — SIGNIFICANT CHANGE UP (ref 27–31)
MCHC RBC-ENTMCNC: 30.5 PG — SIGNIFICANT CHANGE UP (ref 27–31)
MCHC RBC-ENTMCNC: 30.6 PG — SIGNIFICANT CHANGE UP (ref 27–31)
MCHC RBC-ENTMCNC: 32.1 G/DL — SIGNIFICANT CHANGE UP (ref 32–37)
MCHC RBC-ENTMCNC: 33.2 G/DL — SIGNIFICANT CHANGE UP (ref 32–37)
MCHC RBC-ENTMCNC: 33.2 G/DL — SIGNIFICANT CHANGE UP (ref 32–37)
MCV RBC AUTO: 91.8 FL — SIGNIFICANT CHANGE UP (ref 81–99)
MCV RBC AUTO: 92 FL — SIGNIFICANT CHANGE UP (ref 81–99)
MCV RBC AUTO: 93.6 FL — SIGNIFICANT CHANGE UP (ref 81–99)
MONOCYTES # BLD AUTO: 0.69 K/UL — HIGH (ref 0.1–0.6)
MONOCYTES # BLD AUTO: 0.97 K/UL — HIGH (ref 0.1–0.6)
MONOCYTES # BLD AUTO: 0.98 K/UL — HIGH (ref 0.1–0.6)
MONOCYTES NFR BLD AUTO: 5.6 % — SIGNIFICANT CHANGE UP (ref 1.7–9.3)
MONOCYTES NFR BLD AUTO: 7 % — SIGNIFICANT CHANGE UP (ref 1.7–9.3)
MONOCYTES NFR BLD AUTO: 7.2 % — SIGNIFICANT CHANGE UP (ref 1.7–9.3)
NEUTROPHILS # BLD AUTO: 11.17 K/UL — HIGH (ref 1.4–6.5)
NEUTROPHILS # BLD AUTO: 11.94 K/UL — HIGH (ref 1.4–6.5)
NEUTROPHILS # BLD AUTO: 12.14 K/UL — HIGH (ref 1.4–6.5)
NEUTROPHILS NFR BLD AUTO: 86.8 % — HIGH (ref 42.2–75.2)
NEUTROPHILS NFR BLD AUTO: 88.5 % — HIGH (ref 42.2–75.2)
NEUTROPHILS NFR BLD AUTO: 90 % — HIGH (ref 42.2–75.2)
NITRITE UR-MCNC: NEGATIVE — SIGNIFICANT CHANGE UP
NRBC # BLD: 0 /100 WBCS — SIGNIFICANT CHANGE UP (ref 0–0)
PH UR: 6 — SIGNIFICANT CHANGE UP (ref 5–8)
PLATELET # BLD AUTO: 192 K/UL — SIGNIFICANT CHANGE UP (ref 130–400)
PLATELET # BLD AUTO: 204 K/UL — SIGNIFICANT CHANGE UP (ref 130–400)
PLATELET # BLD AUTO: 246 K/UL — SIGNIFICANT CHANGE UP (ref 130–400)
POTASSIUM SERPL-MCNC: 3.9 MMOL/L — SIGNIFICANT CHANGE UP (ref 3.5–5)
POTASSIUM SERPL-MCNC: 4.4 MMOL/L — SIGNIFICANT CHANGE UP (ref 3.5–5)
POTASSIUM SERPL-MCNC: 4.4 MMOL/L — SIGNIFICANT CHANGE UP (ref 3.5–5)
POTASSIUM SERPL-SCNC: 3.9 MMOL/L — SIGNIFICANT CHANGE UP (ref 3.5–5)
POTASSIUM SERPL-SCNC: 4.4 MMOL/L — SIGNIFICANT CHANGE UP (ref 3.5–5)
POTASSIUM SERPL-SCNC: 4.4 MMOL/L — SIGNIFICANT CHANGE UP (ref 3.5–5)
PROT SERPL-MCNC: 4.6 G/DL — LOW (ref 6–8)
PROT SERPL-MCNC: 4.8 G/DL — LOW (ref 6–8)
PROT UR-MCNC: 30
RBC # BLD: 2.66 M/UL — LOW (ref 4.2–5.4)
RBC # BLD: 2.92 M/UL — LOW (ref 4.2–5.4)
RBC # BLD: 3.01 M/UL — LOW (ref 4.2–5.4)
RBC # FLD: 14.6 % — HIGH (ref 11.5–14.5)
RBC # FLD: 14.7 % — HIGH (ref 11.5–14.5)
RBC # FLD: 15.2 % — HIGH (ref 11.5–14.5)
RBC CASTS # UR COMP ASSIST: (no result) /HPF
SODIUM SERPL-SCNC: 138 MMOL/L — SIGNIFICANT CHANGE UP (ref 135–146)
SODIUM SERPL-SCNC: 139 MMOL/L — SIGNIFICANT CHANGE UP (ref 135–146)
SODIUM SERPL-SCNC: 143 MMOL/L — SIGNIFICANT CHANGE UP (ref 135–146)
SP GR SPEC: >=1.03 — SIGNIFICANT CHANGE UP (ref 1.01–1.03)
TIBC SERPL-MCNC: 188 UG/DL — LOW (ref 260–445)
TRANSFERRIN SERPL-MCNC: 159 MG/DL — LOW (ref 200–360)
TROPONIN T SERPL-MCNC: 0.02 NG/ML — HIGH
TROPONIN T SERPL-MCNC: 0.03 NG/ML — CRITICAL HIGH
TSH SERPL-MCNC: 2.1 UIU/ML — SIGNIFICANT CHANGE UP (ref 0.27–4.2)
UIBC SERPL-MCNC: 164 UG/DL — SIGNIFICANT CHANGE UP (ref 110–370)
UROBILINOGEN FLD QL: 0.2 — SIGNIFICANT CHANGE UP (ref 0.2–0.2)
WBC # BLD: 12.4 K/UL — HIGH (ref 4.8–10.8)
WBC # BLD: 13.49 K/UL — HIGH (ref 4.8–10.8)
WBC # BLD: 13.98 K/UL — HIGH (ref 4.8–10.8)
WBC # FLD AUTO: 12.4 K/UL — HIGH (ref 4.8–10.8)
WBC # FLD AUTO: 13.49 K/UL — HIGH (ref 4.8–10.8)
WBC # FLD AUTO: 13.98 K/UL — HIGH (ref 4.8–10.8)
WBC UR QL: (no result) /HPF

## 2018-04-24 RX ORDER — AMIODARONE HYDROCHLORIDE 400 MG/1
150 TABLET ORAL ONCE
Qty: 0 | Refills: 0 | Status: COMPLETED | OUTPATIENT
Start: 2018-04-24 | End: 2018-04-24

## 2018-04-24 RX ORDER — OXYCODONE HYDROCHLORIDE 5 MG/1
10 TABLET ORAL EVERY 4 HOURS
Qty: 0 | Refills: 0 | Status: DISCONTINUED | OUTPATIENT
Start: 2018-04-24 | End: 2018-04-27

## 2018-04-24 RX ORDER — PANTOPRAZOLE SODIUM 20 MG/1
40 TABLET, DELAYED RELEASE ORAL
Qty: 0 | Refills: 0 | Status: DISCONTINUED | OUTPATIENT
Start: 2018-04-24 | End: 2018-04-27

## 2018-04-24 RX ORDER — PHENYLEPHRINE HYDROCHLORIDE 10 MG/ML
0.04 INJECTION INTRAVENOUS
Qty: 160 | Refills: 0 | Status: DISCONTINUED | OUTPATIENT
Start: 2018-04-24 | End: 2018-04-26

## 2018-04-24 RX ORDER — CEFTRIAXONE 500 MG/1
1 INJECTION, POWDER, FOR SOLUTION INTRAMUSCULAR; INTRAVENOUS ONCE
Qty: 0 | Refills: 0 | Status: DISCONTINUED | OUTPATIENT
Start: 2018-04-24 | End: 2018-04-24

## 2018-04-24 RX ORDER — ENOXAPARIN SODIUM 100 MG/ML
40 INJECTION SUBCUTANEOUS DAILY
Qty: 0 | Refills: 0 | Status: DISCONTINUED | OUTPATIENT
Start: 2018-04-24 | End: 2018-04-24

## 2018-04-24 RX ORDER — PHENYLEPHRINE HYDROCHLORIDE 10 MG/ML
0.1 INJECTION INTRAVENOUS
Qty: 160 | Refills: 0 | Status: DISCONTINUED | OUTPATIENT
Start: 2018-04-24 | End: 2018-04-24

## 2018-04-24 RX ORDER — AMIODARONE HYDROCHLORIDE 400 MG/1
1 TABLET ORAL
Qty: 900 | Refills: 0 | Status: DISCONTINUED | OUTPATIENT
Start: 2018-04-24 | End: 2018-04-25

## 2018-04-24 RX ORDER — ENOXAPARIN SODIUM 100 MG/ML
60 INJECTION SUBCUTANEOUS ONCE
Qty: 0 | Refills: 0 | Status: COMPLETED | OUTPATIENT
Start: 2018-04-24 | End: 2018-04-24

## 2018-04-24 RX ORDER — ENOXAPARIN SODIUM 100 MG/ML
60 INJECTION SUBCUTANEOUS
Qty: 0 | Refills: 0 | Status: DISCONTINUED | OUTPATIENT
Start: 2018-04-24 | End: 2018-04-25

## 2018-04-24 RX ORDER — MORPHINE SULFATE 50 MG/1
2 CAPSULE, EXTENDED RELEASE ORAL EVERY 4 HOURS
Qty: 0 | Refills: 0 | Status: DISCONTINUED | OUTPATIENT
Start: 2018-04-24 | End: 2018-04-27

## 2018-04-24 RX ORDER — CEFTRIAXONE 500 MG/1
INJECTION, POWDER, FOR SOLUTION INTRAMUSCULAR; INTRAVENOUS
Qty: 0 | Refills: 0 | Status: DISCONTINUED | OUTPATIENT
Start: 2018-04-24 | End: 2018-04-24

## 2018-04-24 RX ORDER — AMIODARONE HYDROCHLORIDE 400 MG/1
0.5 TABLET ORAL
Qty: 900 | Refills: 0 | Status: DISCONTINUED | OUTPATIENT
Start: 2018-04-24 | End: 2018-04-25

## 2018-04-24 RX ADMIN — ENOXAPARIN SODIUM 60 MILLIGRAM(S): 100 INJECTION SUBCUTANEOUS at 17:55

## 2018-04-24 RX ADMIN — SODIUM CHLORIDE 100 MILLILITER(S): 9 INJECTION, SOLUTION INTRAVENOUS at 18:44

## 2018-04-24 RX ADMIN — ENOXAPARIN SODIUM 60 MILLIGRAM(S): 100 INJECTION SUBCUTANEOUS at 10:38

## 2018-04-24 RX ADMIN — PHENYLEPHRINE HYDROCHLORIDE 0.5 MICROGRAM(S)/KG/MIN: 10 INJECTION INTRAVENOUS at 18:43

## 2018-04-24 RX ADMIN — Medication 12.5 MILLIGRAM(S): at 00:25

## 2018-04-24 RX ADMIN — AMIODARONE HYDROCHLORIDE 618 MILLIGRAM(S): 400 TABLET ORAL at 16:45

## 2018-04-24 RX ADMIN — AMIODARONE HYDROCHLORIDE 33.33 MG/MIN: 400 TABLET ORAL at 18:43

## 2018-04-24 RX ADMIN — AMIODARONE HYDROCHLORIDE 16.67 MG/MIN: 400 TABLET ORAL at 23:00

## 2018-04-24 RX ADMIN — Medication 12.5 MILLIGRAM(S): at 19:56

## 2018-04-24 RX ADMIN — PANTOPRAZOLE SODIUM 40 MILLIGRAM(S): 20 TABLET, DELAYED RELEASE ORAL at 18:44

## 2018-04-24 NOTE — CONSULT NOTE ADULT - ATTENDING COMMENTS
as above
Orthopedic Surgery  Patient seen and examined.  PMHx Medications and Allergies reviewed.  X-rays and CT reviewed.  Agree with findings, assessment and plan.    Risks, benefits and alternative to surgical management of the patient's fracture were reviewed with patient's family, her son's questions were answered to his satisfaction and he wishes to proceed with proposed surgery.    Will plan ORIF when medically optimized.

## 2018-04-24 NOTE — CONSULT NOTE ADULT - ASSESSMENT
fall, hip fx  afib, unknown duration, high likely paroxismal if not chronic  rhabdomyolysis, volume depletion, low bp  tachycardia due to afib, volume depletion, fx    iv hydration, current pressor support,   I am not sure about amiodarone therapy considering unknown duration of afib, alternative is digoxin iv, digitalization 0.25 mg iv q 4 h for 4 doses and then 0.125 mg daily iv, until when hydrated and urine out put restored to reassess then will consider beta blocker  anticoagulation is not recommended  will need asessment for hip fx: conservative management or surgery after stabilizing medical condition?

## 2018-04-24 NOTE — PROGRESS NOTE ADULT - ASSESSMENT
84 year old female DNR/DNI s/p found down for unknown period of time, last seen 2 days prior. Patient with multiple comorbities/injuries including right intertrochanteric fracture, left 9th rib fracture, new onset a. fib, hypothermia, UTI, elevated CK levels.     DIAGNOSIS:  #) High anion-gap metabolic acidosis with acute hypercapneic respiratory failure likely 2/2 sepsis secondary to acute cystitis  #) Acute comminuted R intertrochanteric femoral fracture  #) Acute minimally displaced posterior L 9th Rib Fracture   #) Mild troponinemia 2/2 new-onset AFib with RVR  #) Heart failure 2/2 tachyarrhythmia   #) Hypothermia (r/o myxedema coma)  #) Rhabdomyolsis 2/2 mechanical fall  #) Multinodular goiter  #) Anemia  #) Mechanical fall (r/o syncope 2/2 cardiac arrhythmia, unlikely vasovagal)    PLAN:  CNS:   - Not on sedatives  - Warming blanket   - Check Temp as per GI protocol    HEENT:  - None    PULMONARY:  - Incentive Spirometer at bedside    CARDIOVASCULAR:  - On Levophed @ 5.625 mL/hr  - f/u Cardio   - TSH 2.10, Trop uptrending 0.01 --> 0.02 --> 0.03   - BNP 8476  - c/w Lopressor 12.5 mg BID  - f/u 2D Echo  - CHADSVASC 2    GI:  - Start on PO Protonix 40 mg QD    RENAL:  - c/w LR @ 100 cc/hr for rhabdomyolysis   - Strict IOs  - Holding ACE-I due to nephrotoxicity    INFECTIOUS DISEASE:  - Positive UA   - c/w Rocephin 1 g QD (Day 2)  - f/u UCx, BCx    HEMATOLOGICAL:  - Elevated INR     ENDOCRINE:  - OP follow-up for multinodular thyroid  - f/u AM Cortisol    MUSCULOSKELETAL:  - Ortho: R Hip ORIF when stable. Pelvis, R Hip, R Femur XR   - Family to decide if they want any operative management  - Pain Control for L 9th rib fracture: Morphine 2 mg Q4H PRN, Oxycodone IR 10 mg Q4H PRN  - Incentive spirometer  - Trend serial CK for rhabdomyolysis  - No compartment syndrome appreciated now  - Activity: NWB RLE   - DVT ppx: Lovenox     CODE STATUS: DNR/DNI    DISPOSITION: Possibly rehab/SNF when stable 83 yo F with PMHx of macular degeneration, HTN, and hx of syncopal episodes as per family, found down by family members last seen well 2 days prior, found with new-onset AFib, rhabdomyolysis, R intertrochanteric fracture, L 9th rib fracture, hypothermia which resolved with warming blanket, asymptomatic bacteruria.     DIAGNOSIS:  #) High AGMA with acute hypercapneic respiratory failure 2/2 hypothermia (Resolved)  #) Acute comminuted R intertrochanteric femoral fracture  #) Acute minimally displaced posterior L 9th Rib Fracture   #) Mild troponinemia and heart strain 2/2 new-onset AFib with RVR (BNP 8476)  #) Rhabdomyolysis 2/2 mechanical fall (CK 2748)  #) Multinodular goiter with euthymia   #) Normocytic Anemia   #) Mechanical fall (r/o syncope 2/2 cardiac arrhythmia vs structural heart defects, unlikely vasovagal)  #) Leukocytosis, likely reactive     PLAN:  CNS:   - Not on sedation    HEENT:  - None    PULMONARY:  - Incentive Spirometer at bedside    CARDIOVASCULAR:  - Wean off Levophed. If BP is stable, then no need to start Neosynephrine.   - Keep SBP > 100, c/w Lopressor 12.5 mg BID   - Requesting Cardiology evaluation of new-onset AFib and syncope. CHADSVASC 2, however anticoagulation is held due to family reports of progressively lengthening syncopal episodes  - TSH 2.10, Trop uptrending 0.01 --> 0.02 --> 0.03   - f/u 2D Echo    GI:  - Start on PO Protonix 40 mg QD    RENAL:  - c/w LR @ 100 cc/hr for rhabdomyolysis   - Continue to trend CPK   - Strict IOs, d/c Montemayor   - Holding ACE-I due to nephrotoxicity    INFECTIOUS DISEASE:  - Asymptomatic bacteruria: No need for ABx   - f/u UCx, BCx    HEMATOLOGICAL:  - Elevated INR 1.34  - f/u Iron studies  - Reactive leukocytosis; follow-up pancultures    ENDOCRINE:  - OP follow-up for multinodular thyroid  - f/u AM Cortisol    MUSCULOSKELETAL:  - Ortho: No compartment syndrome noted. R Hip ORIF when stable. Pelvis, R Hip, R Femur XR   - Family to decide if they want any operative management  - Pain Control for L 9th rib fracture: Morphine 2 mg Q4H PRN, Oxycodone IR 10 mg Q4H PRN  - Activity: NWB RLE   - DVT ppx: Switch to therapeutic Lovenox 60 mg BID    CODE STATUS: DNR/DNI    DISPOSITION: Possibly rehab/SNF when stable 83 yo F with PMHx of macular degeneration, HTN, and hx of syncopal episodes as per family, found down by family members last seen well 2 days prior, found with new-onset AFib, rhabdomyolysis, R intertrochanteric fracture, L 9th rib fracture, hypothermia which resolved with warming blanket, asymptomatic bacteruria.     DIAGNOSIS:  #) High AGMA with acute hypercapneic respiratory failure 2/2 hypothermia (Resolved)  #) Acute comminuted R intertrochanteric femoral fracture  #) Acute minimally displaced posterior L 9th Rib Fracture   #) Mild troponinemia and heart strain 2/2 new-onset AFib with RVR (BNP 8476)  #) Rhabdomyolysis 2/2 mechanical fall (CK 2748)  #) Multinodular goiter with euthymia   #) Normocytic Anemia   #) Mechanical fall (r/o syncope 2/2 cardiac arrhythmia vs structural heart defects, unlikely vasovagal)  #) Leukocytosis, likely reactive     PLAN:  CNS:   - Not on sedation    HEENT:  - None    PULMONARY:  - Incentive Spirometer at bedside  - No evidence of fluid overload. Net fluid balance: +3.8 L    CARDIOVASCULAR:  - Wean off Levophed. If BP is stable, then no need to start Neosynephrine.   - Keep SBP > 100, c/w Lopressor 12.5 mg BID   - Requesting Cardiology evaluation of new-onset AFib and syncope. CHADSVASC 2, however anticoagulation is held due to family reports of progressively lengthening syncopal episodes  - TSH 2.10, Trop uptrending 0.01 --> 0.02 --> 0.03   - f/u 2D Echo    GI:  - Start on PO Protonix 40 mg QD    RENAL:  - c/w LR @ 100 cc/hr for rhabdomyolysis   - Continue to trend CPK   - Strict IOs, d/c Montemayor   - Holding ACE-I due to nephrotoxicity    INFECTIOUS DISEASE:  - Asymptomatic bacteruria: No need for ABx   - f/u UCx, BCx    HEMATOLOGICAL:  - Elevated INR 1.34  - f/u Iron studies  - Reactive leukocytosis; follow-up pancultures    ENDOCRINE:  - OP follow-up for multinodular thyroid  - f/u AM Cortisol    MUSCULOSKELETAL:  - Ortho: No compartment syndrome noted. R Hip ORIF when stable. Pelvis, R Hip, R Femur XR   - Family to decide if they want any operative management  - Pain Control for L 9th rib fracture: Morphine 2 mg Q4H PRN, Oxycodone IR 10 mg Q4H PRN  - Activity: NWB RLE   - DVT ppx: Switch to therapeutic Lovenox 60 mg BID    CODE STATUS: DNR/DNI    DISPOSITION: Possibly rehab/SNF when stable 83 yo F with PMHx of macular degeneration, HTN, and hx of syncopal episodes as per family, found down by family members last seen well 2 days prior, found with new-onset AFib, rhabdomyolysis, R intertrochanteric fracture, L 9th rib fracture, hypothermia which resolved with warming blanket, asymptomatic bacteruria.     DIAGNOSIS:  #) High AGMA with acute hypercapneic respiratory failure 2/2 hypothermia (Resolved)  #) Acute comminuted R intertrochanteric femoral fracture  #) Acute minimally displaced posterior L 9th Rib Fracture   #) Mild troponinemia and heart strain 2/2 new-onset AFib with RVR (BNP 8476)  #) Rhabdomyolysis 2/2 mechanical fall (CK 2748)  #) Multinodular goiter with euthymia   #) Normocytic Anemia   #) Mechanical fall (r/o syncope 2/2 cardiac arrhythmia vs structural heart defects, unlikely vasovagal)  #) Leukocytosis, likely reactive     PLAN:  CNS:   - Not on sedation    HEENT:  - None    PULMONARY:  - Incentive Spirometer at bedside  - No evidence of fluid overload. Net fluid balance: +3.8 L    CARDIOVASCULAR:  - Wean off Levophed. If BP is stable, then no need to start Neosynephrine.   - Keep SBP > 100, c/w Lopressor 12.5 mg BID   - Requesting Cardiology evaluation of new-onset AFib and syncope. CHADSVASC 2, however anticoagulation is held due to family reports of progressively lengthening syncopal episodes  - Spoke with PCP Dr. Marcial who noted PMHx of mild LV dysfunction and mid CAD. Last cath in 2013, no stents. Was being seen for hypertension without any known comorbidities  - TSH 2.10, Trop uptrending 0.01 --> 0.02 --> 0.03   - f/u 2D Echo    GI:  - Start on PO Protonix 40 mg QD    RENAL:  - c/w LR @ 100 cc/hr for rhabdomyolysis   - Continue to trend CPK   - Strict IOs, d/c Montemayor   - Holding ACE-I due to nephrotoxicity    INFECTIOUS DISEASE:  - Asymptomatic bacteruria: No need for ABx   - f/u UCx, BCx    HEMATOLOGICAL:  - Elevated INR 1.34  - f/u Iron studies  - Reactive leukocytosis; follow-up pancultures    ENDOCRINE:  - OP follow-up for multinodular thyroid  - f/u AM Cortisol    MUSCULOSKELETAL:  - Ortho: No compartment syndrome noted. R Hip ORIF when stable. Pelvis, R Hip, R Femur XR   - Family to decide if they want any operative management  - Pain Control for L 9th rib fracture: Morphine 2 mg Q4H PRN, Oxycodone IR 10 mg Q4H PRN  - Activity: NWB RLE   - DVT ppx: Switch to therapeutic Lovenox 60 mg BID    CODE STATUS: DNR/DNI    DISPOSITION: Possibly rehab/SNF when stable

## 2018-04-24 NOTE — CONSULT NOTE ADULT - SUBJECTIVE AND OBJECTIVE BOX
Patient is a 84y old  Female who presents with a chief complaint of found on the floor in afib    HPI: fall, for unknown period of time on the floor, rhabdomyolysis, afib (no prior Hx, but severely enlarged atria, high likely paroxismal afib), dehydrated, volume depleted acidotic, olyguric, on pressor support and amiodarone IV (not anti coagulated, duration of afib is unknown and is getting iv amiodarone)    84 year old female, found down by family members last seen/spoken to 2 days prior, unknown down time. Brought in by EMS. Pt DNR/DNI. On nonrebreather and warming blankets in ED, in Afib on the monitor, per family no history of Afib. PMHX includes HTN. Pt. Found to have right intertrochanteric fracture and a left 9th rib fracture on imaging. Patient also found to have CK of 2400 and +UTI with WBC of 15. Pt. seen and examined. (23 Apr 2018 16:47)      PAST MEDICAL & SURGICAL HISTORY:  HTN (hypertension)      PREVIOUS DIAGNOSTIC TESTING:      ECHO  FINDINGS: < from: Transthoracic Echocardiogram (04.24.18 @ 09:01) >   1. Severely enlarged left atrium.   2. Severely enlarged right atrium.   3. LV Ejection Fraction by Luis's Method with a biplane EF of 56 %.   4. Normal left ventricular size and wall thicknesses, with normal   systolic and diastolic function.   5. Mild mitral valve regurgitation.   6. Moderate tricuspid regurgitation.   7. Mild aortic regurgitation.   8. Estimated pulmonary artery systolic pressure is 48.4 mmHg assuming a   right atrial pressure of 15 mmHg, which is consistent with mild pulmonary   hypertension.   9. Pulmonary hypertension is present.  10. LA volume Index is 63.1 ml/m² ml/m2.    < end of copied text >      STRESS TEST  FINDINGS:    CATHETERIZATION  FINDINGS:    MEDICATIONS  (STANDING):  amiodarone Infusion 1 mG/Min (33.333 mL/Hr) IV Continuous <Continuous>  amiodarone Infusion 0.5 mG/Min (16.667 mL/Hr) IV Continuous <Continuous>  enoxaparin Injectable 60 milliGRAM(s) SubCutaneous two times a day  lactated ringers. 1000 milliLiter(s) (100 mL/Hr) IV Continuous <Continuous>  metoprolol tartrate 12.5 milliGRAM(s) Oral two times a day  pantoprazole    Tablet 40 milliGRAM(s) Oral before breakfast  phenylephrine    Infusion 0.045 MICROgram(s)/kG/Min (0.5 mL/Hr) IV Continuous <Continuous>    MEDICATIONS  (PRN):  morphine  - Injectable 2 milliGRAM(s) IV Push every 4 hours PRN Severe Pain (7 - 10)  oxyCODONE    IR 10 milliGRAM(s) Oral every 4 hours PRN Moderate Pain (4 - 6)      FAMILY HISTORY:      SOCIAL HISTORY:  CIGARETTES:  ALCOHOL:  DRUGS:                      REVIEW OF SYSTEMS:  not able to give hx        Vital Signs Last 24 Hrs  T(C): 37.6 (24 Apr 2018 20:00), Max: 37.7 (24 Apr 2018 03:20)  T(F): 99.6 (24 Apr 2018 20:00), Max: 99.9 (24 Apr 2018 03:20)  HR: 100 (24 Apr 2018 20:30) (94 - 130)  BP: 111/67 (24 Apr 2018 20:30) (71/53 - 119/63)  BP(mean): 85 (24 Apr 2018 20:30) (49 - 95)  RR: 22 (24 Apr 2018 20:30) (6 - 30)  SpO2: 100% (24 Apr 2018 20:30) (66% - 100%)                      PHYSICAL EXAM:  GENERAL: No distress  NECK: Supple, No JVD  NERVOUS SYSTEM: SLEEPY,   CHEST/LUNG: Normal air entry to lung base bilaterally; No wheeze, crackle, rales, rhonchi  HEART: Irregular heart beat, S1, A2, P2, No gallop, No murmur  ABDOMEN: Soft, Non tender, Non distended; Bowel sounds present  EXTREMITIES:  2+ Peripheral Pulses, No clubbing, No edema  SKIN: No rashes or lesions    TELEMETRY: AFIB    ECG: < from: 12 Lead ECG (04.23.18 @ 20:07) >  Diagnosis Line Atrial fibrillation with rapid ventricular response  Low voltage QRS  Nonspecific T wave abnormality    < end of copied text >      I&O's Detail    23 Apr 2018 07:01  -  24 Apr 2018 07:00  --------------------------------------------------------  IN:    0.9% NaCl: 4000 mL    lactated ringers.: 500 mL    norepinephrine Infusion: 32.9 mL  Total IN: 4532.9 mL    OUT:    Indwelling Catheter - Urethral: 250 mL    Voided: 450 mL  Total OUT: 700 mL    Total NET: 3832.9 mL      24 Apr 2018 07:01  -  24 Apr 2018 20:41  --------------------------------------------------------  IN:    amiodarone Infusion: 133.2 mL    IV PiggyBack: 100 mL    lactated ringers.: 1300 mL    norepinephrine Infusion: 11.2 mL    Oral Fluid: 340 mL    phenylephrine   Infusion: 39.2 mL    phenylephrine   Infusion: 5.5 mL  Total IN: 1929.1 mL    OUT:    Indwelling Catheter - Urethral: 150 mL    Post-Void Residual per Intermittent Catheterization: 300 mL    Stool: 1 mL  Total OUT: 451 mL    Total NET: 1478.1 mL          LABS:                        9.2    13.49 )-----------( 204      ( 24 Apr 2018 04:30 )             27.7     04-24    138  |  101  |  41<H>  ----------------------------<  60<L>  4.4   |  19  |  0.7    Ca    8.0<L>      24 Apr 2018 04:30  Mg     2.2     04-24    TPro  4.8<L>  /  Alb  2.9<L>  /  TBili  0.8  /  DBili  0.2  /  AST  157<H>  /  ALT  77<H>  /  AlkPhos  65  04-24    CARDIAC MARKERS ( 24 Apr 2018 04:30 )  x     / 0.03 ng/mL / 2471 U/L / x     / x      CARDIAC MARKERS ( 24 Apr 2018 00:37 )  x     / 0.02 ng/mL / 1891 U/L / x     / x      CARDIAC MARKERS ( 23 Apr 2018 12:08 )  x     / <0.01 ng/mL / 2478 U/L / x     / x          PT/INR - ( 23 Apr 2018 12:08 )   PT: 14.60 sec;   INR: 1.34 ratio         PTT - ( 24 Apr 2018 04:30 )  PTT:19.5 sec  Urinalysis Basic - ( 24 Apr 2018 17:50 )    Color: Yellow / Appearance: Clear / SG: >=1.030 / pH: x  Gluc: x / Ketone: 40  / Bili: Small / Urobili: 0.2   Blood: x / Protein: 30 / Nitrite: Negative   Leuk Esterase: Small / RBC: 5-10 /HPF / WBC 6-10 /HPF   Sq Epi: x / Non Sq Epi: Few /HPF / Bacteria: x      I&O's Summary    23 Apr 2018 07:01  -  24 Apr 2018 07:00  --------------------------------------------------------  IN: 4532.9 mL / OUT: 700 mL / NET: 3832.9 mL    24 Apr 2018 07:01  -  24 Apr 2018 20:41  --------------------------------------------------------  IN: 1929.1 mL / OUT: 451 mL / NET: 1478.1 mL        RADIOLOGY & ADDITIONAL STUDIES:

## 2018-04-24 NOTE — PROGRESS NOTE ADULT - SUBJECTIVE AND OBJECTIVE BOX
OVERNIGHT EVENTS:   None    HISTORY OF PRESENTING ILLNESS:   85 yo F with history of syncopal episodes as per family, found down by family members last seen well 2 days prior, unknown down time. Brought in by EMS. Hypothermic to 80 F. Received a total of 5 L (4 L LR, 1 L NS) placed on Norepinephrine gtt for short period of time (BP 60/40s). Improvement of temperature mental status. DNR/DNI. On NRB and warming blankets in ED, found to have new-onset Afib with RVR PMHX includes HTN. Pt. Found to have R intertrochanteric fracture and a L 9th rib fracture on imaging. Patient also found to have CK of 2400 and +UTI with WBC of 15.     Endorses headache, multiple episodes of syncope. Latest 1 month ago. Progressively longer episodes.     Admission Vitals: T 94.9, , /59, RR 18, SpO2 100% on Supplemental O2  Admission Labs: K 5.5, AG 22, BUN 50, WBC 15.51, Hb 10.4, Neutro % 83.8, INR 1.34  VBG (18 12:00): 7.22, 59/34/24    PAST MEDICAL & SURGICAL HISTORY:  PAST MEDICAL & SURGICAL HISTORY:  HTN (hypertension)    SOCIAL HISTORY:    ALLERGIES:  No Known Allergies    MEDICATIONS:  STANDING MEDICATIONS  enoxaparin Injectable 40 milliGRAM(s) SubCutaneous daily  lactated ringers. 1000 milliLiter(s) IV Continuous <Continuous>  metoprolol tartrate 12.5 milliGRAM(s) Oral two times a day  norepinephrine Infusion 0.05 MICROgram(s)/kG/Min IV Continuous <Continuous>    PRN MEDICATIONS  morphine  - Injectable 2 milliGRAM(s) IV Push every 4 hours PRN  oxyCODONE    IR 10 milliGRAM(s) Oral every 4 hours PRN    VITALS:   T(F): 99.9  HR: 118  BP: 85/59  RR: 22  SpO2: 98%    18 @ 07:01  -  18 @ 07:00  --------------------------------------------------------  IN: 4532.9 mL / OUT: 700 mL / NET: 3832.9 mL    LABS:                        9.2    13.49 )-----------( 204      ( 2018 04:30 )             27.7         138  |  101  |  41<H>  ----------------------------<  60<L>  4.4   |  19  |  0.7    Ca    8.0<L>      2018 04:30  Mg     2.2         TPro  4.8<L>  /  Alb  2.9<L>  /  TBili  0.8  /  DBili  0.2  /  AST  157<H>  /  ALT  77<H>  /  AlkPhos  65      PT/INR - ( 2018 12:08 )   PT: 14.60 sec;   INR: 1.34 ratio      PTT - ( 2018 04:30 )  PTT:19.5 sec  Urinalysis Basic - ( 2018 12:34 )    Color: Yellow / Appearance: Cloudy / S.015 / pH: x  Gluc: x / Ketone: 15  / Bili: Negative / Urobili: 0.2 mg/dL   Blood: x / Protein: 30 mg/dL / Nitrite: Positive   Leuk Esterase: Small / RBC: 5-10 /HPF / WBC 3-5 /HPF   Sq Epi: x / Non Sq Epi: Occasional /HPF / Bacteria: Moderate /HPF    Creatine Kinase, Serum: >2000 U/L <H> (18 @ 04:30)  Troponin T, Serum: 0.03 ng/mL <HH> (18 @ 04:30)  Creatine Kinase, Serum: 1891 U/L <H> (18 @ 00:37)  Troponin T, Serum: 0.02 ng/mL <H> (18 @ 00:37)  Troponin T, Serum: <0.01 ng/mL (18 @ 12:08)  Creatine Kinase, Serum: 2478 U/L <H> (18 @ 12:08)    CARDIAC MARKERS ( 2018 04:30 )  x     / 0.03 ng/mL / >2000 U/L / x     / x      CARDIAC MARKERS ( 2018 00:37 )  x     / 0.02 ng/mL / 1891 U/L / x     / x      CARDIAC MARKERS ( 2018 12:08 )  x     / <0.01 ng/mL / 2478 U/L / x     / x        RADIOLOGY:  CT Head NC (): Negative, chronic microvascular ischemic changes.    CT Cervical Spine NC (): Osteopenia without acute fracture or prevertebral soft tissue swelling. Mild retrolisthesis C4 on C5, anterolisthesis C6 on C7. Moderate intervertebral disc space narrowing C4-C5, C5-C6. C3-C4 disc bulge, severe R foraminal stenosis secondary to uncovertebral joint spurring and facet arthropathy. C4-C5 osteophyte complex with severe R and mild L foraminal stenosis. C5-C6 mod R and mild L foraminal stenosis with disc osteophyte complex. C6-C7 mild spinal canal stenosis. Mild R foraminal stenosis. Multinodular thyroid gland.    CXR (): L rib fracture.     CT A/P with IV Contrast (): Acute comminuted R intratrochanteric R femoral fracture    CT Chest ():   1. Acute, comminuted right intratrochanteric right femoral fracture.  2.  Acute, minimally displaced left posterior 9th rib fracture; no pneumothorax.   3. Age-indeterminate T6-L2 vertebral body compression deformities.   4. Enlarged, partially calcified left thyroid lobe/nodule. Consider outpatient ultrasound.    EKG (): AFib with RVR    PHYSICAL EXAM:  GEN: No acute distress  HEENT:   LUNGS: Clear to auscultation bilaterally   HEART: S1/S2 present. RRR.   ABD: Soft, non-tender, non-distended. Bowel sounds present  EXT: Moves all 4 extremities  NEURO: AAOX3. Obeys commands. OVERNIGHT EVENTS:   None    HISTORY OF PRESENTING ILLNESS:   85 yo F with PMHx of macular degeneration, HTN, and hx of syncopal episodes as per family, found down by family members last seen well 2 days prior, unknown down time. Brought in by EMS. She was found shirtless in the basement, completely drenched with water/cleaning solution. Hypothermic to 80 F. Received a total of 5 L (4 L LR, 1 L NS) placed on Norepinephrine gtt for short period of time (BP 60/40s). Improvement of temperature mental status. DNR/DNI. On NRB and warming blankets in ED, found to have new-onset Afib with RVR. Pt. Found to have R intertrochanteric fracture and a L 9th rib fracture on imaging. Patient also found to have CK of 2400 and +UTI with WBC of 15.     Endorses headache, multiple episodes of syncope. Latest 1 month ago. Progressively longer episodes.     Admission Vitals: T 94.9, , /59, RR 18, SpO2 100% on Supplemental O2  Admission Labs: K 5.5, AG 22, BUN 50, WBC 15.51, Hb 10.4, Neutro % 83.8, INR 1.34  VBG (18 12:00): 7.22, /34/24    PAST MEDICAL & SURGICAL HISTORY:  PAST MEDICAL & SURGICAL HISTORY:  HTN (hypertension)    SOCIAL HISTORY:  Negative     ALLERGIES:  No Known Allergies    MEDICATIONS:  STANDING MEDICATIONS  enoxaparin Injectable 40 milliGRAM(s) SubCutaneous daily  lactated ringers. 1000 milliLiter(s) IV Continuous <Continuous>  metoprolol tartrate 12.5 milliGRAM(s) Oral two times a day  norepinephrine Infusion 0.05 MICROgram(s)/kG/Min IV Continuous <Continuous>    PRN MEDICATIONS  morphine  - Injectable 2 milliGRAM(s) IV Push every 4 hours PRN  oxyCODONE    IR 10 milliGRAM(s) Oral every 4 hours PRN    VITALS:   T(F): 99.9  HR: 118  BP: 85/59  RR: 22  SpO2: 98%    18 @ 07:01  -  18 @ 07:00  --------------------------------------------------------  IN: 4532.9 mL / OUT: 700 mL / NET: 3832.9 mL    LABS:                        9.2    13.49 )-----------( 204      ( 2018 04:30 )             27.7         138  |  101  |  41<H>  ----------------------------<  60<L>  4.4   |  19  |  0.7    Ca    8.0<L>      2018 04:30  Mg     2.2         TPro  4.8<L>  /  Alb  2.9<L>  /  TBili  0.8  /  DBili  0.2  /  AST  157<H>  /  ALT  77<H>  /  AlkPhos  65      PT/INR - ( 2018 12:08 )   PT: 14.60 sec;   INR: 1.34 ratio      PTT - ( 2018 04:30 )  PTT:19.5 sec  Urinalysis Basic - ( 2018 12:34 )    Color: Yellow / Appearance: Cloudy / S.015 / pH: x  Gluc: x / Ketone: 15  / Bili: Negative / Urobili: 0.2 mg/dL   Blood: x / Protein: 30 mg/dL / Nitrite: Positive   Leuk Esterase: Small / RBC: 5-10 /HPF / WBC 3-5 /HPF   Sq Epi: x / Non Sq Epi: Occasional /HPF / Bacteria: Moderate /HPF    Creatine Kinase, Serum: >2000 U/L <H> (18 @ 04:30)  Troponin T, Serum: 0.03 ng/mL <HH> (18 @ 04:30)  Creatine Kinase, Serum: 1891 U/L <H> (18 @ 00:37)  Troponin T, Serum: 0.02 ng/mL <H> (18 @ 00:37)  Troponin T, Serum: <0.01 ng/mL (18 @ 12:08)  Creatine Kinase, Serum: 2478 U/L <H> (18 @ 12:08)    CARDIAC MARKERS ( 2018 04:30 )  x     / 0.03 ng/mL / >2000 U/L / x     / x      CARDIAC MARKERS ( 2018 00:37 )  x     / 0.02 ng/mL / 1891 U/L / x     / x      CARDIAC MARKERS ( 2018 12:08 )  x     / <0.01 ng/mL / 2478 U/L / x     / x        RADIOLOGY:  CT Head NC (): Negative, chronic microvascular ischemic changes.    CT Cervical Spine NC (): Osteopenia without acute fracture or prevertebral soft tissue swelling. Mild retrolisthesis C4 on C5, anterolisthesis C6 on C7. Moderate intervertebral disc space narrowing C4-C5, C5-C6. C3-C4 disc bulge, severe R foraminal stenosis secondary to uncovertebral joint spurring and facet arthropathy. C4-C5 osteophyte complex with severe R and mild L foraminal stenosis. C5-C6 mod R and mild L foraminal stenosis with disc osteophyte complex. C6-C7 mild spinal canal stenosis. Mild R foraminal stenosis. Multinodular thyroid gland.    CXR (): L rib fracture.     CT A/P with IV Contrast (): Acute comminuted R intratrochanteric R femoral fracture    CT Chest ():   1. Acute, comminuted right intratrochanteric right femoral fracture.  2.  Acute, minimally displaced left posterior 9th rib fracture; no pneumothorax.   3. Age-indeterminate T6-L2 vertebral body compression deformities.   4. Enlarged, partially calcified left thyroid lobe/nodule. Consider outpatient ultrasound.    EKG (): AFib with RVR    PHYSICAL EXAM:  GEN: No acute distress  HEENT: NCAT  LUNGS: Clear to auscultation bilaterally   HEART: S1/S2 present. Tachypneic.   ABD: Soft, non-tender, non-distended. Bowel sounds present  EXT: Moves all 4 extremities  NEURO: AAOX3. Obeys commands. OVERNIGHT EVENTS:   None    HISTORY OF PRESENTING ILLNESS:   85 yo F with PMHx of macular degeneration, HTN, and hx of syncopal episodes as per family, found down by family members last seen well 2 days prior, unknown down time. Brought in by EMS. She was found shirtless in the basement, completely drenched with water/cleaning solution. Hypothermic to 80 F. Received a total of 5 L (4 L LR, 1 L NS) placed on Norepinephrine gtt for short period of time (BP 60/40s). Improvement of temperature mental status. DNR/DNI. On NRB and warming blankets in ED, found to have new-onset Afib with RVR. Pt. Found to have R intertrochanteric fracture and a L 9th rib fracture on imaging. Patient also found to have CK of 2400 and +UTI with WBC of 15.     Endorses headache, multiple episodes of syncope. Latest 1 month ago. Progressively longer episodes.     Admission Vitals: T 94.9, , /59, RR 18, SpO2 100% on Supplemental O2  Admission Labs: K 5.5, AG 22, BUN 50, WBC 15.51, Hb 10.4, Neutro % 83.8, INR 1.34  VBG (18 12:00): 7.22, 59/34/24    HCP: Raven Henry    PAST MEDICAL & SURGICAL HISTORY:  PAST MEDICAL & SURGICAL HISTORY:  HTN (hypertension)    SOCIAL HISTORY:  Negative     ALLERGIES:  No Known Allergies    MEDICATIONS:  STANDING MEDICATIONS  enoxaparin Injectable 40 milliGRAM(s) SubCutaneous daily  lactated ringers. 1000 milliLiter(s) IV Continuous <Continuous>  metoprolol tartrate 12.5 milliGRAM(s) Oral two times a day  norepinephrine Infusion 0.05 MICROgram(s)/kG/Min IV Continuous <Continuous>    PRN MEDICATIONS  morphine  - Injectable 2 milliGRAM(s) IV Push every 4 hours PRN  oxyCODONE    IR 10 milliGRAM(s) Oral every 4 hours PRN    VITALS:   T(F): 99.9  HR: 118  BP: 85/59  RR: 22  SpO2: 98%    18 @ 07:01  -  18 @ 07:00  --------------------------------------------------------  IN: 4532.9 mL / OUT: 700 mL / NET: 3832.9 mL    LABS:                        9.2    13.49 )-----------( 204      ( 2018 04:30 )             27.7         138  |  101  |  41<H>  ----------------------------<  60<L>  4.4   |  19  |  0.7    Ca    8.0<L>      2018 04:30  Mg     2.2         TPro  4.8<L>  /  Alb  2.9<L>  /  TBili  0.8  /  DBili  0.2  /  AST  157<H>  /  ALT  77<H>  /  AlkPhos  65      PT/INR - ( 2018 12:08 )   PT: 14.60 sec;   INR: 1.34 ratio      PTT - ( 2018 04:30 )  PTT:19.5 sec  Urinalysis Basic - ( 2018 12:34 )    Color: Yellow / Appearance: Cloudy / S.015 / pH: x  Gluc: x / Ketone: 15  / Bili: Negative / Urobili: 0.2 mg/dL   Blood: x / Protein: 30 mg/dL / Nitrite: Positive   Leuk Esterase: Small / RBC: 5-10 /HPF / WBC 3-5 /HPF   Sq Epi: x / Non Sq Epi: Occasional /HPF / Bacteria: Moderate /HPF    Creatine Kinase, Serum: >2000 U/L <H> (18 @ 04:30)  Troponin T, Serum: 0.03 ng/mL <HH> (18 @ 04:30)  Creatine Kinase, Serum: 1891 U/L <H> (18 @ 00:37)  Troponin T, Serum: 0.02 ng/mL <H> (18 @ 00:37)  Troponin T, Serum: <0.01 ng/mL (18 @ 12:08)  Creatine Kinase, Serum: 2478 U/L <H> (18 @ 12:08)    CARDIAC MARKERS ( 2018 04:30 )  x     / 0.03 ng/mL / >2000 U/L / x     / x      CARDIAC MARKERS ( 2018 00:37 )  x     / 0.02 ng/mL / 1891 U/L / x     / x      CARDIAC MARKERS ( 2018 12:08 )  x     / <0.01 ng/mL / 2478 U/L / x     / x        RADIOLOGY:  CT Head NC (): Negative, chronic microvascular ischemic changes.    CT Cervical Spine NC (): Osteopenia without acute fracture or prevertebral soft tissue swelling. Mild retrolisthesis C4 on C5, anterolisthesis C6 on C7. Moderate intervertebral disc space narrowing C4-C5, C5-C6. C3-C4 disc bulge, severe R foraminal stenosis secondary to uncovertebral joint spurring and facet arthropathy. C4-C5 osteophyte complex with severe R and mild L foraminal stenosis. C5-C6 mod R and mild L foraminal stenosis with disc osteophyte complex. C6-C7 mild spinal canal stenosis. Mild R foraminal stenosis. Multinodular thyroid gland.    CXR (): L rib fracture.     CT A/P with IV Contrast (): Acute comminuted R intratrochanteric R femoral fracture    CT Chest ():   1. Acute, comminuted right intratrochanteric right femoral fracture.  2.  Acute, minimally displaced left posterior 9th rib fracture; no pneumothorax.   3. Age-indeterminate T6-L2 vertebral body compression deformities.   4. Enlarged, partially calcified left thyroid lobe/nodule. Consider outpatient ultrasound.    EKG (): AFib with RVR    PHYSICAL EXAM:  GEN: No acute distress  HEENT: NCAT  LUNGS: Clear to auscultation bilaterally   HEART: S1/S2 present. Tachypneic.   ABD: Soft, non-tender, non-distended. Bowel sounds present  EXT: Moves all 4 extremities  NEURO: AAOX3. Obeys commands. OVERNIGHT EVENTS:   None    HISTORY OF PRESENTING ILLNESS:   83 yo F with PMHx of macular degeneration, HTN, mild LV dysfunction, mild CAD (cath in 2013) and hx of syncopal episodes as per family, found down by family members last seen well 2 days prior, unknown down time. Brought in by EMS. She was found shirtless in the basement, completely drenched with water/cleaning solution. Hypothermic to 80 F. Received a total of 5 L (4 L LR, 1 L NS) placed on Norepinephrine gtt for short period of time (BP 60/40s). Improvement of temperature mental status. DNR/DNI. On NRB and warming blankets in ED, found to have new-onset Afib with RVR. Pt. Found to have R intertrochanteric fracture and a L 9th rib fracture on imaging. Patient also found to have CK of 2400 and +UTI with WBC of 15.     Endorses headache, multiple episodes of syncope. Latest 1 month ago. Progressively longer episodes.     Admission Vitals: T 94.9, , /59, RR 18, SpO2 100% on Supplemental O2  Admission Labs: K 5.5, AG 22, BUN 50, WBC 15.51, Hb 10.4, Neutro % 83.8, INR 1.34  VBG (18 12:00): 7.22, //24    HCP: Raven Hnery    PAST MEDICAL & SURGICAL HISTORY:  PAST MEDICAL & SURGICAL HISTORY:  HTN (hypertension)    SOCIAL HISTORY:  Negative     ALLERGIES:  No Known Allergies    MEDICATIONS:  STANDING MEDICATIONS  enoxaparin Injectable 40 milliGRAM(s) SubCutaneous daily  lactated ringers. 1000 milliLiter(s) IV Continuous <Continuous>  metoprolol tartrate 12.5 milliGRAM(s) Oral two times a day  norepinephrine Infusion 0.05 MICROgram(s)/kG/Min IV Continuous <Continuous>    PRN MEDICATIONS  morphine  - Injectable 2 milliGRAM(s) IV Push every 4 hours PRN  oxyCODONE    IR 10 milliGRAM(s) Oral every 4 hours PRN    VITALS:   T(F): 99.9  HR: 118  BP: 85/59  RR: 22  SpO2: 98%    18 @ 07:01  -  18 @ 07:00  --------------------------------------------------------  IN: 4532.9 mL / OUT: 700 mL / NET: 3832.9 mL    LABS:                        9.2    13.49 )-----------( 204      ( 2018 04:30 )             27.7         138  |  101  |  41<H>  ----------------------------<  60<L>  4.4   |  19  |  0.7    Ca    8.0<L>      2018 04:30  Mg     2.2         TPro  4.8<L>  /  Alb  2.9<L>  /  TBili  0.8  /  DBili  0.2  /  AST  157<H>  /  ALT  77<H>  /  AlkPhos  65      PT/INR - ( 2018 12:08 )   PT: 14.60 sec;   INR: 1.34 ratio      PTT - ( 2018 04:30 )  PTT:19.5 sec  Urinalysis Basic - ( 2018 12:34 )    Color: Yellow / Appearance: Cloudy / S.015 / pH: x  Gluc: x / Ketone: 15  / Bili: Negative / Urobili: 0.2 mg/dL   Blood: x / Protein: 30 mg/dL / Nitrite: Positive   Leuk Esterase: Small / RBC: 5-10 /HPF / WBC 3-5 /HPF   Sq Epi: x / Non Sq Epi: Occasional /HPF / Bacteria: Moderate /HPF    Creatine Kinase, Serum: >2000 U/L <H> (18 @ 04:30)  Troponin T, Serum: 0.03 ng/mL <HH> (18 @ 04:30)  Creatine Kinase, Serum: 1891 U/L <H> (18 @ 00:37)  Troponin T, Serum: 0.02 ng/mL <H> (18 @ 00:37)  Troponin T, Serum: <0.01 ng/mL (18 @ 12:08)  Creatine Kinase, Serum: 2478 U/L <H> (18 @ 12:08)    CARDIAC MARKERS ( 2018 04:30 )  x     / 0.03 ng/mL / >2000 U/L / x     / x      CARDIAC MARKERS ( 2018 00:37 )  x     / 0.02 ng/mL / 1891 U/L / x     / x      CARDIAC MARKERS ( 2018 12:08 )  x     / <0.01 ng/mL / 2478 U/L / x     / x        RADIOLOGY:  CT Head NC (): Negative, chronic microvascular ischemic changes.    CT Cervical Spine NC (): Osteopenia without acute fracture or prevertebral soft tissue swelling. Mild retrolisthesis C4 on C5, anterolisthesis C6 on C7. Moderate intervertebral disc space narrowing C4-C5, C5-C6. C3-C4 disc bulge, severe R foraminal stenosis secondary to uncovertebral joint spurring and facet arthropathy. C4-C5 osteophyte complex with severe R and mild L foraminal stenosis. C5-C6 mod R and mild L foraminal stenosis with disc osteophyte complex. C6-C7 mild spinal canal stenosis. Mild R foraminal stenosis. Multinodular thyroid gland.    CXR (): L rib fracture.     CT A/P with IV Contrast (): Acute comminuted R intratrochanteric R femoral fracture    CT Chest ():   1. Acute, comminuted right intratrochanteric right femoral fracture.  2.  Acute, minimally displaced left posterior 9th rib fracture; no pneumothorax.   3. Age-indeterminate T6-L2 vertebral body compression deformities.   4. Enlarged, partially calcified left thyroid lobe/nodule. Consider outpatient ultrasound.    EKG (): AFib with RVR    PHYSICAL EXAM:  GEN: No acute distress  HEENT: NCAT  LUNGS: Clear to auscultation bilaterally   HEART: S1/S2 present. Tachypneic.   ABD: Soft, non-tender, non-distended. Bowel sounds present  EXT: Moves all 4 extremities  NEURO: AAOX3. Obeys commands.

## 2018-04-25 LAB
-  AMIKACIN: SIGNIFICANT CHANGE UP
-  AMOXICILLIN/CLAVULANIC ACID: SIGNIFICANT CHANGE UP
-  AMPICILLIN/SULBACTAM: SIGNIFICANT CHANGE UP
-  AMPICILLIN: SIGNIFICANT CHANGE UP
-  AZTREONAM: SIGNIFICANT CHANGE UP
-  CEFAZOLIN: SIGNIFICANT CHANGE UP
-  CEFEPIME: SIGNIFICANT CHANGE UP
-  CEFOXITIN: SIGNIFICANT CHANGE UP
-  CEFTRIAXONE: SIGNIFICANT CHANGE UP
-  CIPROFLOXACIN: SIGNIFICANT CHANGE UP
-  ERTAPENEM: SIGNIFICANT CHANGE UP
-  GENTAMICIN: SIGNIFICANT CHANGE UP
-  IMIPENEM: SIGNIFICANT CHANGE UP
-  LEVOFLOXACIN: SIGNIFICANT CHANGE UP
-  MEROPENEM: SIGNIFICANT CHANGE UP
-  NITROFURANTOIN: SIGNIFICANT CHANGE UP
-  PIPERACILLIN/TAZOBACTAM: SIGNIFICANT CHANGE UP
-  TIGECYCLINE: SIGNIFICANT CHANGE UP
-  TOBRAMYCIN: SIGNIFICANT CHANGE UP
-  TRIMETHOPRIM/SULFAMETHOXAZOLE: SIGNIFICANT CHANGE UP
ALBUMIN SERPL ELPH-MCNC: 2 G/DL — LOW (ref 3.5–5.2)
ALP SERPL-CCNC: 48 U/L — SIGNIFICANT CHANGE UP (ref 30–115)
ALT FLD-CCNC: 47 U/L — HIGH (ref 0–41)
ANION GAP SERPL CALC-SCNC: 20 MMOL/L — HIGH (ref 7–14)
APTT BLD: 35.3 SEC — SIGNIFICANT CHANGE UP (ref 27–39.2)
AST SERPL-CCNC: 69 U/L — HIGH (ref 0–41)
BASE EXCESS BLDA CALC-SCNC: -2.1 MMOL/L — LOW (ref -2–2)
BASOPHILS # BLD AUTO: 0.01 K/UL — SIGNIFICANT CHANGE UP (ref 0–0.2)
BASOPHILS # BLD AUTO: 0.01 K/UL — SIGNIFICANT CHANGE UP (ref 0–0.2)
BASOPHILS NFR BLD AUTO: 0.1 % — SIGNIFICANT CHANGE UP (ref 0–1)
BASOPHILS NFR BLD AUTO: 0.1 % — SIGNIFICANT CHANGE UP (ref 0–1)
BILIRUB DIRECT SERPL-MCNC: 0.2 MG/DL — SIGNIFICANT CHANGE UP (ref 0–0.2)
BILIRUB INDIRECT FLD-MCNC: 0.3 MG/DL — SIGNIFICANT CHANGE UP (ref 0.2–1.2)
BILIRUB SERPL-MCNC: 0.5 MG/DL — SIGNIFICANT CHANGE UP (ref 0.2–1.2)
BUN SERPL-MCNC: 30 MG/DL — HIGH (ref 10–20)
CALCIUM SERPL-MCNC: 7.1 MG/DL — LOW (ref 8.5–10.1)
CHLORIDE SERPL-SCNC: 102 MMOL/L — SIGNIFICANT CHANGE UP (ref 98–110)
CK SERPL-CCNC: 868 U/L — HIGH (ref 0–225)
CO2 SERPL-SCNC: 16 MMOL/L — LOW (ref 17–32)
CREAT SERPL-MCNC: 0.5 MG/DL — LOW (ref 0.7–1.5)
CULTURE RESULTS: NO GROWTH — SIGNIFICANT CHANGE UP
CULTURE RESULTS: SIGNIFICANT CHANGE UP
EOSINOPHIL # BLD AUTO: 0 K/UL — SIGNIFICANT CHANGE UP (ref 0–0.7)
EOSINOPHIL # BLD AUTO: 0 K/UL — SIGNIFICANT CHANGE UP (ref 0–0.7)
EOSINOPHIL NFR BLD AUTO: 0 % — SIGNIFICANT CHANGE UP (ref 0–8)
EOSINOPHIL NFR BLD AUTO: 0 % — SIGNIFICANT CHANGE UP (ref 0–8)
FERRITIN SERPL-MCNC: 189 NG/ML — HIGH (ref 15–150)
GLUCOSE SERPL-MCNC: 72 MG/DL — SIGNIFICANT CHANGE UP (ref 70–99)
HCO3 BLDA-SCNC: 22 MMOL/L — LOW (ref 23–27)
HCT VFR BLD CALC: 18.9 % — LOW (ref 37–47)
HCT VFR BLD CALC: 24.8 % — LOW (ref 37–47)
HGB BLD-MCNC: 6.3 G/DL — CRITICAL LOW (ref 12–16)
HGB BLD-MCNC: 8 G/DL — LOW (ref 12–16)
IMM GRANULOCYTES NFR BLD AUTO: 0.6 % — HIGH (ref 0.1–0.3)
IMM GRANULOCYTES NFR BLD AUTO: 0.6 % — HIGH (ref 0.1–0.3)
INR BLD: 1.4 RATIO — HIGH (ref 0.65–1.3)
LYMPHOCYTES # BLD AUTO: 0.63 K/UL — LOW (ref 1.2–3.4)
LYMPHOCYTES # BLD AUTO: 1.05 K/UL — LOW (ref 1.2–3.4)
LYMPHOCYTES # BLD AUTO: 10.1 % — LOW (ref 20.5–51.1)
LYMPHOCYTES # BLD AUTO: 7 % — LOW (ref 20.5–51.1)
MAGNESIUM SERPL-MCNC: 1.6 MG/DL — LOW (ref 1.8–2.4)
MCHC RBC-ENTMCNC: 29.9 PG — SIGNIFICANT CHANGE UP (ref 27–31)
MCHC RBC-ENTMCNC: 30.7 PG — SIGNIFICANT CHANGE UP (ref 27–31)
MCHC RBC-ENTMCNC: 32.3 G/DL — SIGNIFICANT CHANGE UP (ref 32–37)
MCHC RBC-ENTMCNC: 33.3 G/DL — SIGNIFICANT CHANGE UP (ref 32–37)
MCV RBC AUTO: 92.2 FL — SIGNIFICANT CHANGE UP (ref 81–99)
MCV RBC AUTO: 92.5 FL — SIGNIFICANT CHANGE UP (ref 81–99)
METHOD TYPE: SIGNIFICANT CHANGE UP
MONOCYTES # BLD AUTO: 0.63 K/UL — HIGH (ref 0.1–0.6)
MONOCYTES # BLD AUTO: 0.63 K/UL — HIGH (ref 0.1–0.6)
MONOCYTES NFR BLD AUTO: 6.1 % — SIGNIFICANT CHANGE UP (ref 1.7–9.3)
MONOCYTES NFR BLD AUTO: 7 % — SIGNIFICANT CHANGE UP (ref 1.7–9.3)
NEUTROPHILS # BLD AUTO: 7.71 K/UL — HIGH (ref 1.4–6.5)
NEUTROPHILS # BLD AUTO: 8.66 K/UL — HIGH (ref 1.4–6.5)
NEUTROPHILS NFR BLD AUTO: 83.1 % — HIGH (ref 42.2–75.2)
NEUTROPHILS NFR BLD AUTO: 85.3 % — HIGH (ref 42.2–75.2)
NRBC # BLD: 0 /100 WBCS — SIGNIFICANT CHANGE UP (ref 0–0)
NRBC # BLD: 0 /100 WBCS — SIGNIFICANT CHANGE UP (ref 0–0)
ORGANISM # SPEC MICROSCOPIC CNT: SIGNIFICANT CHANGE UP
ORGANISM # SPEC MICROSCOPIC CNT: SIGNIFICANT CHANGE UP
PCO2 BLDA: 33 MMHG — LOW (ref 38–42)
PH BLDA: 7.43 — HIGH (ref 7.38–7.42)
PLATELET # BLD AUTO: 158 K/UL — SIGNIFICANT CHANGE UP (ref 130–400)
PLATELET # BLD AUTO: 190 K/UL — SIGNIFICANT CHANGE UP (ref 130–400)
PO2 BLDA: 67 MMHG — LOW (ref 78–95)
POTASSIUM SERPL-MCNC: 4.2 MMOL/L — SIGNIFICANT CHANGE UP (ref 3.5–5)
POTASSIUM SERPL-SCNC: 4.2 MMOL/L — SIGNIFICANT CHANGE UP (ref 3.5–5)
PROT SERPL-MCNC: 3.4 G/DL — LOW (ref 6–8)
PROTHROM AB SERPL-ACNC: 15.2 SEC — HIGH (ref 9.95–12.87)
RBC # BLD: 2.05 M/UL — LOW (ref 4.2–5.4)
RBC # BLD: 2.68 M/UL — LOW (ref 4.2–5.4)
RBC # FLD: 15.1 % — HIGH (ref 11.5–14.5)
RBC # FLD: 15.3 % — HIGH (ref 11.5–14.5)
SAO2 % BLDA: 95 % — SIGNIFICANT CHANGE UP (ref 94–98)
SODIUM SERPL-SCNC: 138 MMOL/L — SIGNIFICANT CHANGE UP (ref 135–146)
SPECIMEN SOURCE: SIGNIFICANT CHANGE UP
SPECIMEN SOURCE: SIGNIFICANT CHANGE UP
T4 AB SER-ACNC: 5 UG/DL — SIGNIFICANT CHANGE UP (ref 4.6–12)
TROPONIN T SERPL-MCNC: 0.02 NG/ML — HIGH
WBC # BLD: 10.41 K/UL — SIGNIFICANT CHANGE UP (ref 4.8–10.8)
WBC # BLD: 9.03 K/UL — SIGNIFICANT CHANGE UP (ref 4.8–10.8)
WBC # FLD AUTO: 10.41 K/UL — SIGNIFICANT CHANGE UP (ref 4.8–10.8)
WBC # FLD AUTO: 9.03 K/UL — SIGNIFICANT CHANGE UP (ref 4.8–10.8)

## 2018-04-25 RX ORDER — ENOXAPARIN SODIUM 100 MG/ML
40 INJECTION SUBCUTANEOUS DAILY
Qty: 0 | Refills: 0 | Status: DISCONTINUED | OUTPATIENT
Start: 2018-04-25 | End: 2018-04-27

## 2018-04-25 RX ORDER — DIGOXIN 250 MCG
0.12 TABLET ORAL DAILY
Qty: 0 | Refills: 0 | Status: DISCONTINUED | OUTPATIENT
Start: 2018-04-26 | End: 2018-04-27

## 2018-04-25 RX ORDER — DIGOXIN 250 MCG
0.25 TABLET ORAL EVERY 4 HOURS
Qty: 0 | Refills: 0 | Status: COMPLETED | OUTPATIENT
Start: 2018-04-25 | End: 2018-04-26

## 2018-04-25 RX ORDER — CEFTRIAXONE 500 MG/1
1 INJECTION, POWDER, FOR SOLUTION INTRAMUSCULAR; INTRAVENOUS EVERY 24 HOURS
Qty: 0 | Refills: 0 | Status: DISCONTINUED | OUTPATIENT
Start: 2018-04-26 | End: 2018-04-26

## 2018-04-25 RX ORDER — MAGNESIUM SULFATE 500 MG/ML
2 VIAL (ML) INJECTION ONCE
Qty: 0 | Refills: 0 | Status: COMPLETED | OUTPATIENT
Start: 2018-04-25 | End: 2018-04-25

## 2018-04-25 RX ORDER — CEFTRIAXONE 500 MG/1
1 INJECTION, POWDER, FOR SOLUTION INTRAMUSCULAR; INTRAVENOUS ONCE
Qty: 0 | Refills: 0 | Status: COMPLETED | OUTPATIENT
Start: 2018-04-25 | End: 2018-04-25

## 2018-04-25 RX ORDER — SENNA PLUS 8.6 MG/1
2 TABLET ORAL AT BEDTIME
Qty: 0 | Refills: 0 | Status: DISCONTINUED | OUTPATIENT
Start: 2018-04-25 | End: 2018-04-27

## 2018-04-25 RX ORDER — CEFTRIAXONE 500 MG/1
INJECTION, POWDER, FOR SOLUTION INTRAMUSCULAR; INTRAVENOUS
Qty: 0 | Refills: 0 | Status: DISCONTINUED | OUTPATIENT
Start: 2018-04-25 | End: 2018-04-26

## 2018-04-25 RX ORDER — FERROUS SULFATE 325(65) MG
325 TABLET ORAL DAILY
Qty: 0 | Refills: 0 | Status: DISCONTINUED | OUTPATIENT
Start: 2018-04-25 | End: 2018-04-27

## 2018-04-25 RX ORDER — DOCUSATE SODIUM 100 MG
100 CAPSULE ORAL
Qty: 0 | Refills: 0 | Status: DISCONTINUED | OUTPATIENT
Start: 2018-04-25 | End: 2018-04-27

## 2018-04-25 RX ADMIN — ENOXAPARIN SODIUM 60 MILLIGRAM(S): 100 INJECTION SUBCUTANEOUS at 06:13

## 2018-04-25 RX ADMIN — MORPHINE SULFATE 2 MILLIGRAM(S): 50 CAPSULE, EXTENDED RELEASE ORAL at 23:18

## 2018-04-25 RX ADMIN — MORPHINE SULFATE 2 MILLIGRAM(S): 50 CAPSULE, EXTENDED RELEASE ORAL at 21:40

## 2018-04-25 RX ADMIN — Medication 0.25 MILLIGRAM(S): at 21:12

## 2018-04-25 RX ADMIN — CEFTRIAXONE 100 GRAM(S): 500 INJECTION, POWDER, FOR SOLUTION INTRAMUSCULAR; INTRAVENOUS at 12:06

## 2018-04-25 RX ADMIN — Medication 50 GRAM(S): at 10:03

## 2018-04-25 NOTE — PROGRESS NOTE ADULT - ASSESSMENT
I discussed this case with Dr. SA JR SELF at about 1:30 AM.  The patient overall is stable.  There is no new cardiac issue.  Other than some mild confusion there is no change in her neurological status.  The plan is to transfer her to the medical surgical floor.

## 2018-04-25 NOTE — DIETITIAN INITIAL EVALUATION ADULT. - SOURCE
Pt is DNR/DNI s/p bound down for unknown period of time. w/ multiple comorbidities and injury  including intertrochanteric fx.. Now with  now w/ ortho consult. L 9th rib fx. elevated CK. possible rehab/snf when stable/family/significant other/other (specify)/patient

## 2018-04-25 NOTE — DIETITIAN INITIAL EVALUATION ADULT. - PT NOT SOURCE
other (specify)/Consult for no specified reason- contacted [phone], pt's son in law picked up and unable to provide too much information. pt's daughter is at home but in the chair s/p recent discharge from [facility] as well. Now LBM 4/25. 2+ b/l foot, ecchymosis.

## 2018-04-25 NOTE — DIETITIAN INITIAL EVALUATION ADULT. - DIET TYPE
unable to obtain PO trend at this time, likely none this morning per RN. Pt has 2 active diet orders/low sodium/NPO

## 2018-04-25 NOTE — CHART NOTE - NSCHARTNOTEFT_GEN_A_CORE
ICU DOWNGRADE NOTE:    HOSPITAL COURSE:  85 yo F with PMHx of macular degeneration, HTN, mild LV dysfunction, mild CAD (cath in 2013) and hx of syncopal episodes as per family, found down by family members last seen well 2 days prior, unknown down time. Brought in by EMS. She was found shirtless in the basement, completely drenched with water/cleaning solution. Hypothermic to 80 F. Received a total of 5 L (4 L LR, 1 L NS) placed on Norepinephrine gtt for short period of time (BP 60/40s). Improvement of temperature mental status. DNR/DNI. On NRB and warming blankets in ED, found to have new-onset Afib with RVR. Pt. Found to have R intertrochanteric fracture and a L 9th rib fracture on imaging. Patient also found to have CK of 2400 and +UTI with WBC of 15.     Endorses headache, multiple episodes of syncope. Latest 1 month ago. Progressively longer episodes.     Admission Vitals: T 94.9, , /59, RR 18, SpO2 100% on Supplemental O2  Admission Labs: K 5.5, AG 22, BUN 50, WBC 15.51, Hb 10.4, Neutro % 83.8, INR 1.34  VBG (04/23/18 12:00): 7.22, 59/34/24    HCP: Raven Henry    DIAGNOSIS:  #) High AGMA with acute hypercapneic respiratory failure 2/2 hypothermia (Resolved)  #) Acute comminuted R intertrochanteric femoral fracture  #) Acute minimally displaced posterior L 9th Rib Fracture   #) Mild troponinemia and heart strain 2/2 new-onset paroxsymal AFib (BNP 8476)  #) Rhabdomyolysis 2/2 mechanical fall (CK 2748)  #) Multinodular goiter with euthymia   #) 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 Britton's recommendation  - Will require Cardiac and Medical clearance/risk stratification for R ORIF  - No AC due to fall risk   - Continue to trend CK for Rhabdomyolysis, CE for Trop  - f/u Cardio  - Started on Iron supplementation for THIERRY   - Montemayor was kept in for urinary retention (500 cc from straight cath in the evening)  - Restarted Rocephin for bacteruria   - f/u multinodular goiter as OP  - d/c therapeutic Lovenox   - NWB RLE   - DNR/DNI ICU DOWNGRADE NOTE:    HOSPITAL COURSE:  85 yo F with PMHx of macular degeneration, HTN, mild LV dysfunction, mild CAD (cath in 2013) and hx of syncopal episodes as per family, found down by family members last seen well 2 days prior, unknown down time. Brought in by EMS. She was found shirtless in the basement, completely drenched with water/cleaning solution. Hypothermic to 80 F. Received a total of 5 L (4 L LR, 1 L NS) placed on Norepinephrine gtt for short period of time (BP 60/40s). Improvement of temperature mental status. DNR/DNI. On NRB and warming blankets in ED, found to have new-onset Afib with RVR. Pt. Found to have R intertrochanteric fracture and a L 9th rib fracture on imaging. Patient also found to have CK of 2400 and +UTI with WBC of 15.     Endorses headache, multiple episodes of syncope. Latest 1 month ago. Progressively longer episodes.     Admission Vitals: T 94.9, , /59, RR 18, SpO2 100% on Supplemental O2  Admission Labs: K 5.5, AG 22, BUN 50, WBC 15.51, Hb 10.4, Neutro % 83.8, INR 1.34  VBG (04/23/18 12:00): 7.22, 59/34/24    HCP: Raven Henry    DIAGNOSIS:  #) High AGMA with acute hypercapneic respiratory failure 2/2 hypothermia (Resolved)  #) Acute comminuted R intertrochanteric femoral fracture  #) Acute minimally displaced posterior L 9th Rib Fracture   #) Mild troponinemia and heart strain 2/2 new-onset paroxsymal AFib (BNP 8476)  #) Rhabdomyolysis 2/2 mechanical fall (CK 2748)  #) Multinodular goiter with euthymia   #) 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 Britton's recommendation  - Will require Cardiac and Medical clearance/risk stratification for R ORIF  - No AC due to fall risk   - Continue to trend CK for Rhabdomyolysis, CE for Trop  - f/u Cardio  - Started on Iron supplementation for THIERRY   - Montemayor was kept in for urinary retention (500 cc from straight cath in the evening)  - Restarted Rocephin for bacteruria   - f/u multinodular goiter as OP  - d/c therapeutic Lovenox   - NWB RLE   - DNR/DNI

## 2018-04-25 NOTE — CHART NOTE - NSCHARTNOTEFT_GEN_A_CORE
GASPER Hamm visited patient's room later today and happened to see patient's family at bedside.   obtained the following nutrition hx:    per daughter in law x2 and son, pt consumes a regular diet but has not been a big eater for few years now, hence weight has gradually gone down but now stable weight.   no significant weight loss recently.  NKFA, takes b12 at home, no supplements intake at home.

## 2018-04-25 NOTE — PROGRESS NOTE ADULT - SUBJECTIVE AND OBJECTIVE BOX
Over Night Events:  Patient seen and examined.       ROS:  See HPI    PHYSICAL EXAM    ICU Vital Signs Last 24 Hrs  T(C): 36.8 (25 Apr 2018 12:00), Max: 38.3 (25 Apr 2018 08:00)  T(F): 98.3 (25 Apr 2018 12:00), Max: 100.9 (25 Apr 2018 08:00)  HR: 106 (25 Apr 2018 12:00) (94 - 144)  BP: 119/71 (25 Apr 2018 12:00) (91/56 - 149/81)  BP(mean): 83 (25 Apr 2018 12:00) (61 - 126)  ABP: --  ABP(mean): --  RR: 26 (25 Apr 2018 12:00) (16 - 41)  SpO2: 98% (25 Apr 2018 12:00) (75% - 100%)    l: Patient examined at about 0745 hrs.  She was sleeping at that time.  She was in atrial fibrillation at about 116 bpm.  She was not on Javed-Synephrine.  Blood pressure 133/77.  Montemayor was in place.  She was a little bit more confused this morning than yesterday but not any other significant change in her neurological status.  Her chest was clear.  She had 1+ edema of the lower extremities.  Her chest x-ray was clear.        I&O's Detail    24 Apr 2018 07:01  -  25 Apr 2018 07:00  --------------------------------------------------------  IN:    amiodarone Infusion: 349.2 mL    IV PiggyBack: 100 mL    lactated ringers.: 2400 mL    norepinephrine Infusion: 11.2 mL    Oral Fluid: 340 mL    phenylephrine   Infusion: 62.6 mL    phenylephrine   Infusion: 5.5 mL  Total IN: 3268.5 mL    OUT:    Indwelling Catheter - Urethral: 660 mL    Post-Void Residual per Intermittent Catheterization: 300 mL    Stool: 2 mL  Total OUT: 962 mL    Total NET: 2306.5 mL      25 Apr 2018 07:01  -  25 Apr 2018 13:55  --------------------------------------------------------  IN:    amiodarone Infusion: 83.4 mL    lactated ringers.: 200 mL  Total IN: 283.4 mL    OUT:    Indwelling Catheter - Urethral: 175 mL  Total OUT: 175 mL    Total NET: 108.4 mL          LABS:                          6.3    9.03  )-----------( 158      ( 25 Apr 2018 04:40 )             18.9         25 Apr 2018 04:40    138    |  102    |  30     ----------------------------<  72     4.2     |  16     |  0.5      Ca    7.1        25 Apr 2018 04:40  Mg     1.6       25 Apr 2018 04:40    TPro  3.4    /  Alb  2.0    /  TBili  0.5    /  DBili  0.2    /  AST  69     /  ALT  47     /  AlkPhos  48     25 Apr 2018 04:40  Amylase x     lipase x                                                 PT/INR - ( 25 Apr 2018 04:40 )   PT: 15.20 sec;   INR: 1.40 ratio         PTT - ( 25 Apr 2018 04:40 )  PTT:35.3 sec                                       Urinalysis Basic - ( 24 Apr 2018 17:50 )    Color: Yellow / Appearance: Clear / SG: >=1.030 / pH: x  Gluc: x / Ketone: 40  / Bili: Small / Urobili: 0.2   Blood: x / Protein: 30 / Nitrite: Negative   Leuk Esterase: Small / RBC: 5-10 /HPF / WBC 6-10 /HPF   Sq Epi: x / Non Sq Epi: Few /HPF / Bacteria: x          CARDIAC MARKERS ( 24 Apr 2018 04:30 )  x     / 0.03 ng/mL / 2471 U/L / x     / x      CARDIAC MARKERS ( 24 Apr 2018 00:37 )  x     / 0.02 ng/mL / 1891 U/L / x     / x                                                            Culture - Urine (collected 23 Apr 2018 12:34)  Source: .Urine Catheterized  Preliminary Report (24 Apr 2018 16:02):    >100,000 CFU/ml Escherichia coli                                                                                       ABG - ( 25 Apr 2018 12:33 )  pH, Arterial: 7.43  pH, Blood: x     /  pCO2: 33    /  pO2: 67    / HCO3: 22    / Base Excess: -2.1  /  SaO2: 95                  MEDICATIONS  (STANDING):  amiodarone Infusion 1 mG/Min (33.333 mL/Hr) IV Continuous <Continuous>  amiodarone Infusion 0.5 mG/Min (16.667 mL/Hr) IV Continuous <Continuous>  cefTRIAXone   IVPB      digoxin  Injectable 0.25 milliGRAM(s) IV Push every 4 hours  metoprolol tartrate 12.5 milliGRAM(s) Oral two times a day  pantoprazole    Tablet 40 milliGRAM(s) Oral before breakfast  phenylephrine    Infusion 0.045 MICROgram(s)/kG/Min (0.5 mL/Hr) IV Continuous <Continuous>    MEDICATIONS  (PRN):  morphine  - Injectable 2 milliGRAM(s) IV Push every 4 hours PRN Severe Pain (7 - 10)  oxyCODONE    IR 10 milliGRAM(s) Oral every 4 hours PRN Moderate Pain (4 - 6)          Xrays:  TLC:  OG:  ET tube:                                                                                       ECHO:    IMPRESSION:      PLAN:    CNS:    HEENT:    PULMONARY:    CARDIOVASCULAR:    GI: GI prophylaxis.  Feeding     RENAL:    INFECTIOUS DISEASE:    HEMATOLOGICAL:  DVT prophylaxis.    ENDOCRINE:  Follow up FS.  Insulin protocol if needed.    MUSCULOSKELETAL:

## 2018-04-25 NOTE — PROGRESS NOTE ADULT - SUBJECTIVE AND OBJECTIVE BOX
OVERNIGHT EVENTS:   Patient was not rate-controlled on Metoprolol 12.5 mg BID. Started on Amiodarone loading.   Patient became delirious, wax-waning level of mental acuity.  Montemayor placed in for urinary retention.     HISTORY OF PRESENTING ILLNESS:   85 yo F with PMHx of macular degeneration, HTN, mild LV dysfunction, mild CAD (cath in 2013) and hx of syncopal episodes as per family, found down by family members last seen well 2 days prior, unknown down time. Brought in by EMS. She was found shirtless in the basement, completely drenched with water/cleaning solution. Hypothermic to 80 F. Received a total of 5 L (4 L LR, 1 L NS) placed on Norepinephrine gtt for short period of time (BP 60/40s). Improvement of temperature mental status. DNR/DNI. On NRB and warming blankets in ED, found to have new-onset Afib with RVR. Pt. Found to have R intertrochanteric fracture and a L 9th rib fracture on imaging. Patient also found to have CK of 2400 and +UTI with WBC of 15.     Endorses headache, multiple episodes of syncope. Latest 1 month ago. Progressively longer episodes.     Admission Vitals: T 94.9, , /59, RR 18, SpO2 100% on Supplemental O2  Admission Labs: K 5.5, AG 22, BUN 50, WBC 15.51, Hb 10.4, Neutro % 83.8, INR 1.34  VBG (04/23/18 12:00): 7.22, 59/34/24    HCP: Raven Henry    PAST MEDICAL & SURGICAL HISTORY:  PAST MEDICAL & SURGICAL HISTORY:  HTN (hypertension)    SOCIAL HISTORY:    ALLERGIES:  No Known Allergies    MEDICATIONS:  STANDING MEDICATIONS  amiodarone Infusion 1 mG/Min IV Continuous <Continuous>  amiodarone Infusion 0.5 mG/Min IV Continuous <Continuous>  enoxaparin Injectable 60 milliGRAM(s) SubCutaneous two times a day  lactated ringers. 1000 milliLiter(s) IV Continuous <Continuous>  metoprolol tartrate 12.5 milliGRAM(s) Oral two times a day  pantoprazole    Tablet 40 milliGRAM(s) Oral before breakfast  phenylephrine    Infusion 0.045 MICROgram(s)/kG/Min IV Continuous <Continuous>    PRN MEDICATIONS  morphine  - Injectable 2 milliGRAM(s) IV Push every 4 hours PRN  oxyCODONE    IR 10 milliGRAM(s) Oral every 4 hours PRN    VITALS:   T(F): 97.6  HR: 110  BP: 130/81  RR: 23  SpO2: 100%    04-23-18 @ 07:01  -  04-24-18 @ 07:00  --------------------------------------------------------  IN: 4532.9 mL / OUT: 700 mL / NET: 3832.9 mL    04-24-18 @ 07:01  -  04-25-18 @ 06:41  --------------------------------------------------------  IN: 3151.9 mL / OUT: 927 mL / NET: 2224.9 mL        LABS:                        6.3    9.03  )-----------( 158      ( 25 Apr 2018 04:40 )             18.9     04-25    138  |  102  |  30<H>  ----------------------------<  72  4.2   |  16<L>  |  0.5<L>    Ca    7.1<L>      25 Apr 2018 04:40  Mg     1.6     04-25    TPro  3.4<L>  /  Alb  2.0<L>  /  TBili  0.5  /  DBili  0.2  /  AST  69<H>  /  ALT  47<H>  /  AlkPhos  48  04-25    PT/INR - ( 25 Apr 2018 04:40 )   PT: 15.20 sec;   INR: 1.40 ratio         PTT - ( 25 Apr 2018 04:40 )  PTT:35.3 sec  Urinalysis Basic - ( 24 Apr 2018 17:50 )    Color: Yellow / Appearance: Clear / SG: >=1.030 / pH: x  Gluc: x / Ketone: 40  / Bili: Small / Urobili: 0.2   Blood: x / Protein: 30 / Nitrite: Negative   Leuk Esterase: Small / RBC: 5-10 /HPF / WBC 6-10 /HPF   Sq Epi: x / Non Sq Epi: Few /HPF / Bacteria: x      Culture - Urine (collected 23 Apr 2018 12:34)  Source: .Urine Catheterized  Preliminary Report (24 Apr 2018 16:02):    >100,000 CFU/ml Escherichia coli      CARDIAC MARKERS ( 24 Apr 2018 04:30 )  x     / 0.03 ng/mL / 2471 U/L / x     / x      CARDIAC MARKERS ( 24 Apr 2018 00:37 )  x     / 0.02 ng/mL / 1891 U/L / x     / x      CARDIAC MARKERS ( 23 Apr 2018 12:08 )  x     / <0.01 ng/mL / 2478 U/L / x     / x          RADIOLOGY:  CT Head NC (04/23): Negative, chronic microvascular ischemic changes.    CT Cervical Spine NC (04/23): Osteopenia without acute fracture or prevertebral soft tissue swelling. Mild retrolisthesis C4 on C5, anterolisthesis C6 on C7. Moderate intervertebral disc space narrowing C4-C5, C5-C6. C3-C4 disc bulge, severe R foraminal stenosis secondary to uncovertebral joint spurring and facet arthropathy. C4-C5 osteophyte complex with severe R and mild L foraminal stenosis. C5-C6 mod R and mild L foraminal stenosis with disc osteophyte complex. C6-C7 mild spinal canal stenosis. Mild R foraminal stenosis. Multinodular thyroid gland.    CXR (04/23): L rib fracture.     CT A/P with IV Contrast (04/23): Acute comminuted R intratrochanteric R femoral fracture    CT Chest (04/23):   1. Acute, comminuted right intratrochanteric right femoral fracture.  2.  Acute, minimally displaced left posterior 9th rib fracture; no pneumothorax.   3. Age-indeterminate T6-L2 vertebral body compression deformities.   4. Enlarged, partially calcified left thyroid lobe/nodule. Consider outpatient ultrasound.    EKG (04/23): AFib with RVR    XR R Hip (04/24): There is diffuse osteopenia. There is acute impaction right intertrochanteric fracture demonstrating varus angulation and with a lesser trochanteric fragment demonstrating 1.6 cm medial displacement. Moderate left greater than right hip osteoarthritis.    CXR AP/PA (04/24): Low lung volumes with bibasilar atelectasis. No definite airspace opacities.    XR Femur (04/24): There is acute right intertrochanteric fracture demonstrating varus angulation, with complete fracture of the lesser trochanter. The remainder of the right femur is intact. There is diffuse osteopenia    2D Echo (04/24): Severely enlarged L/R atrium. LVEF 56%. Normal systolic/diastolic function. Mild MR, mod TR, mild AR. Mild pulm HTN.     PHYSICAL EXAM:  GEN: No acute distress  HEENT: NCAT  LUNGS: Clear to auscultation bilaterally   HEART: S1/S2 present. Tachypneic.   ABD: Soft, non-tender, non-distended. Bowel sounds present. Montemayor in place.   EXT: Moves all 4 extremities.   NEURO: AAOX3. Obeys commands.

## 2018-04-26 DIAGNOSIS — F05 DELIRIUM DUE TO KNOWN PHYSIOLOGICAL CONDITION: ICD-10-CM

## 2018-04-26 LAB
ANION GAP SERPL CALC-SCNC: 11 MMOL/L — SIGNIFICANT CHANGE UP (ref 7–14)
APTT BLD: 26 SEC — LOW (ref 27–39.2)
BASOPHILS # BLD AUTO: 0.01 K/UL — SIGNIFICANT CHANGE UP (ref 0–0.2)
BASOPHILS # BLD AUTO: 0.02 K/UL — SIGNIFICANT CHANGE UP (ref 0–0.2)
BASOPHILS NFR BLD AUTO: 0.1 % — SIGNIFICANT CHANGE UP (ref 0–1)
BASOPHILS NFR BLD AUTO: 0.2 % — SIGNIFICANT CHANGE UP (ref 0–1)
BLD GP AB SCN SERPL QL: SIGNIFICANT CHANGE UP
BUN SERPL-MCNC: 26 MG/DL — HIGH (ref 10–20)
CALCIUM SERPL-MCNC: 7.9 MG/DL — LOW (ref 8.5–10.1)
CHLORIDE SERPL-SCNC: 105 MMOL/L — SIGNIFICANT CHANGE UP (ref 98–110)
CO2 SERPL-SCNC: 25 MMOL/L — SIGNIFICANT CHANGE UP (ref 17–32)
CREAT SERPL-MCNC: 0.7 MG/DL — SIGNIFICANT CHANGE UP (ref 0.7–1.5)
EOSINOPHIL # BLD AUTO: 0.01 K/UL — SIGNIFICANT CHANGE UP (ref 0–0.7)
EOSINOPHIL # BLD AUTO: 0.01 K/UL — SIGNIFICANT CHANGE UP (ref 0–0.7)
EOSINOPHIL NFR BLD AUTO: 0.1 % — SIGNIFICANT CHANGE UP (ref 0–8)
EOSINOPHIL NFR BLD AUTO: 0.1 % — SIGNIFICANT CHANGE UP (ref 0–8)
GLUCOSE SERPL-MCNC: 126 MG/DL — HIGH (ref 70–99)
HCT VFR BLD CALC: 25.1 % — LOW (ref 37–47)
HCT VFR BLD CALC: 26.4 % — LOW (ref 37–47)
HGB BLD-MCNC: 8.2 G/DL — LOW (ref 12–16)
HGB BLD-MCNC: 8.7 G/DL — LOW (ref 12–16)
IMM GRANULOCYTES NFR BLD AUTO: 1.1 % — HIGH (ref 0.1–0.3)
IMM GRANULOCYTES NFR BLD AUTO: 1.1 % — HIGH (ref 0.1–0.3)
INR BLD: 1.17 RATIO — SIGNIFICANT CHANGE UP (ref 0.65–1.3)
LYMPHOCYTES # BLD AUTO: 1.06 K/UL — LOW (ref 1.2–3.4)
LYMPHOCYTES # BLD AUTO: 1.21 K/UL — SIGNIFICANT CHANGE UP (ref 1.2–3.4)
LYMPHOCYTES # BLD AUTO: 10.5 % — LOW (ref 20.5–51.1)
LYMPHOCYTES # BLD AUTO: 9.6 % — LOW (ref 20.5–51.1)
MAGNESIUM SERPL-MCNC: 2.2 MG/DL — SIGNIFICANT CHANGE UP (ref 1.8–2.4)
MCHC RBC-ENTMCNC: 30.7 PG — SIGNIFICANT CHANGE UP (ref 27–31)
MCHC RBC-ENTMCNC: 31 PG — SIGNIFICANT CHANGE UP (ref 27–31)
MCHC RBC-ENTMCNC: 32.7 G/DL — SIGNIFICANT CHANGE UP (ref 32–37)
MCHC RBC-ENTMCNC: 33 G/DL — SIGNIFICANT CHANGE UP (ref 32–37)
MCV RBC AUTO: 94 FL — SIGNIFICANT CHANGE UP (ref 81–99)
MCV RBC AUTO: 94 FL — SIGNIFICANT CHANGE UP (ref 81–99)
MONOCYTES # BLD AUTO: 0.8 K/UL — HIGH (ref 0.1–0.6)
MONOCYTES # BLD AUTO: 0.87 K/UL — HIGH (ref 0.1–0.6)
MONOCYTES NFR BLD AUTO: 7.2 % — SIGNIFICANT CHANGE UP (ref 1.7–9.3)
MONOCYTES NFR BLD AUTO: 7.5 % — SIGNIFICANT CHANGE UP (ref 1.7–9.3)
NEUTROPHILS # BLD AUTO: 9.07 K/UL — HIGH (ref 1.4–6.5)
NEUTROPHILS # BLD AUTO: 9.32 K/UL — HIGH (ref 1.4–6.5)
NEUTROPHILS NFR BLD AUTO: 80.7 % — HIGH (ref 42.2–75.2)
NEUTROPHILS NFR BLD AUTO: 81.8 % — HIGH (ref 42.2–75.2)
NRBC # BLD: 0 /100 WBCS — SIGNIFICANT CHANGE UP (ref 0–0)
PLATELET # BLD AUTO: 208 K/UL — SIGNIFICANT CHANGE UP (ref 130–400)
PLATELET # BLD AUTO: 221 K/UL — SIGNIFICANT CHANGE UP (ref 130–400)
POTASSIUM SERPL-MCNC: 4.6 MMOL/L — SIGNIFICANT CHANGE UP (ref 3.5–5)
POTASSIUM SERPL-SCNC: 4.6 MMOL/L — SIGNIFICANT CHANGE UP (ref 3.5–5)
PROTHROM AB SERPL-ACNC: 12.7 SEC — SIGNIFICANT CHANGE UP (ref 9.95–12.87)
RBC # BLD: 2.67 M/UL — LOW (ref 4.2–5.4)
RBC # BLD: 2.81 M/UL — LOW (ref 4.2–5.4)
RBC # FLD: 15.2 % — HIGH (ref 11.5–14.5)
RBC # FLD: 15.4 % — HIGH (ref 11.5–14.5)
SODIUM SERPL-SCNC: 141 MMOL/L — SIGNIFICANT CHANGE UP (ref 135–146)
TYPE + AB SCN PNL BLD: SIGNIFICANT CHANGE UP
WBC # BLD: 11.08 K/UL — HIGH (ref 4.8–10.8)
WBC # BLD: 11.55 K/UL — HIGH (ref 4.8–10.8)
WBC # FLD AUTO: 11.08 K/UL — HIGH (ref 4.8–10.8)
WBC # FLD AUTO: 11.55 K/UL — HIGH (ref 4.8–10.8)

## 2018-04-26 RX ORDER — SODIUM CHLORIDE 9 MG/ML
1000 INJECTION INTRAMUSCULAR; INTRAVENOUS; SUBCUTANEOUS
Qty: 0 | Refills: 0 | Status: DISCONTINUED | OUTPATIENT
Start: 2018-04-26 | End: 2018-04-27

## 2018-04-26 RX ORDER — AMPICILLIN SODIUM AND SULBACTAM SODIUM 250; 125 MG/ML; MG/ML
3 INJECTION, POWDER, FOR SUSPENSION INTRAMUSCULAR; INTRAVENOUS EVERY 6 HOURS
Qty: 0 | Refills: 0 | Status: DISCONTINUED | OUTPATIENT
Start: 2018-04-26 | End: 2018-04-27

## 2018-04-26 RX ORDER — CEFTRIAXONE 500 MG/1
1 INJECTION, POWDER, FOR SOLUTION INTRAMUSCULAR; INTRAVENOUS EVERY 24 HOURS
Qty: 0 | Refills: 0 | Status: DISCONTINUED | OUTPATIENT
Start: 2018-04-27 | End: 2018-04-27

## 2018-04-26 RX ADMIN — Medication 0.25 MILLIGRAM(S): at 06:34

## 2018-04-26 RX ADMIN — Medication 12.5 MILLIGRAM(S): at 17:19

## 2018-04-26 RX ADMIN — Medication 12.5 MILLIGRAM(S): at 06:34

## 2018-04-26 RX ADMIN — ENOXAPARIN SODIUM 40 MILLIGRAM(S): 100 INJECTION SUBCUTANEOUS at 11:38

## 2018-04-26 RX ADMIN — Medication 0.25 MILLIGRAM(S): at 01:30

## 2018-04-26 RX ADMIN — Medication 100 MILLIGRAM(S): at 17:19

## 2018-04-26 RX ADMIN — MORPHINE SULFATE 2 MILLIGRAM(S): 50 CAPSULE, EXTENDED RELEASE ORAL at 14:42

## 2018-04-26 RX ADMIN — Medication 100 MILLIGRAM(S): at 06:34

## 2018-04-26 RX ADMIN — SENNA PLUS 2 TABLET(S): 8.6 TABLET ORAL at 22:18

## 2018-04-26 RX ADMIN — CEFTRIAXONE 100 GRAM(S): 500 INJECTION, POWDER, FOR SOLUTION INTRAMUSCULAR; INTRAVENOUS at 13:06

## 2018-04-26 RX ADMIN — AMPICILLIN SODIUM AND SULBACTAM SODIUM 200 GRAM(S): 250; 125 INJECTION, POWDER, FOR SUSPENSION INTRAMUSCULAR; INTRAVENOUS at 20:08

## 2018-04-26 RX ADMIN — PANTOPRAZOLE SODIUM 40 MILLIGRAM(S): 20 TABLET, DELAYED RELEASE ORAL at 06:34

## 2018-04-26 RX ADMIN — Medication 0.12 MILLIGRAM(S): at 11:38

## 2018-04-26 RX ADMIN — Medication 325 MILLIGRAM(S): at 11:38

## 2018-04-26 NOTE — BEHAVIORAL HEALTH ASSESSMENT NOTE - HPI (INCLUDE ILLNESS QUALITY, SEVERITY, DURATION, TIMING, CONTEXT, MODIFYING FACTORS, ASSOCIATED SIGNS AND SYMPTOMS)
84 year old female, , domiciled alone, no prior psychiatric history on medical floor s/p admission for hypothermia and right hip fx. Psychiatry consulted as patient has been refusing medical treatment and there is concern for depression.    On interview, patient is alert and oriented to name, location, but not completely to situation and date. She is however able to engage in conversation. She states, "im not depressed, Im just old and I want a ticket to heaven" She denies active suicidal thoughts intent or plan. She perserverates about not wanting to be in the hospital for medical treatment. When asked about her right hip fractures, and the risks and benefits of doing a surgical intervention she states, "I just don't need any more treatment" She denies manic or psychotic symptoms. When asked about events that lead to her hospitalization, she states, "I guess the ambulance was called" She denies AVH.    Spoke to patient's family. They state that the patient is not herself and has been confused in the hospital asking about dead family members and speaking about doctors she saw long ago. They state that she has been depressed since the passing of her  in 1991. They deny suicidality in the patient stating that she is a devout Hindu.

## 2018-04-26 NOTE — BEHAVIORAL HEALTH ASSESSMENT NOTE - NSBHCHARTREVIEWVS_PSY_A_CORE FT
Vital Signs Last 24 Hrs  T(C): 36.4 (26 Apr 2018 05:00), Max: 36.8 (25 Apr 2018 12:00)  T(F): 97.5 (26 Apr 2018 05:00), Max: 98.3 (25 Apr 2018 12:00)  HR: 113 (26 Apr 2018 05:00) (96 - 113)  BP: 118/65 (26 Apr 2018 05:00) (117/75 - 140/74)  BP(mean): 119 (25 Apr 2018 15:30) (83 - 119)  RR: 20 (26 Apr 2018 05:00) (18 - 26)  SpO2: 97% (25 Apr 2018 16:00) (93% - 98%)  04-25

## 2018-04-26 NOTE — PROVIDER CONTACT NOTE (OTHER) - SITUATION
B/L IV's noted to be red, painful. Left arm  noted to be red, warm and edemetis. MD Crowe aware, Left arm IV site noted to have drainage after removal.

## 2018-04-26 NOTE — PROGRESS NOTE ADULT - ASSESSMENT
fall, hip fx needs surgery   afib, paroxismal, converted to NSR, not a proper candidate for anti coagulation    current therapy  Patient is generallyb stable and at moderate risk (increased risk) for hip surgery, if decided to have it done.  on current therapy and without further cardiac test she is able to have the hip surgery done, will do cardiac monitoring for 24-48 hours after the surgery

## 2018-04-26 NOTE — PROGRESS NOTE ADULT - ASSESSMENT
85 yo F with PMHx of macular degeneration, HTN, mild LV dysfunction, mild CAD (cath in 2013) and hx of syncopal episodes as per family, found down by family members last seen well 2 days prior, unknown down time. Brought in by EMS. She was found shirtless in the basement, completely drenched with water/cleaning solution. Hypothermic to 80 F. Received a total of 5 L (4 L LR, 1 L NS) placed on Norepinephrine gtt for short period of time (BP 60/40s). Improvement of temperature mental status. DNR/DNI. On NRB and warming blankets in ED, found to have new-onset Afib with RVR. Pt. Found to have R intertrochanteric fracture and a L 9th rib fracture on imaging. Patient also found to have CK of 2400 and +UTI with WBC of 15. HCP: Raven Henry#) High AGMA with acute hypercapneic respiratory failure 2/2 hypothermia (Resolved)      #) Acute comminuted R intertrochanteric femoral fracture - npo after mn - pt and family agreeable for surgery now, pain control, dvt proph, ortho follow up    #) Acute minimally displaced posterior L 9th Rib Fracture - incentive spirometry    #) Mild troponinemia and heart strain 2/2 new-onset paroxsymal AFib (BNP 8476) - converted to NSR - off pressors, amio - not a good candidate for AC  tele monitor until post op 48 hrs  risk strat appreciated by Cardio Dr Britton  may proceed - pt is moderate risk for surgery   echo normal LV function  previous cath - no stents placed    #) Rhabdomyolysis 2/2 mechanical fall - improved ck 800s  off ivfs    #) Multinodular goiter with euthymia  - TFTs wnl    #) 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 Brought in by EMS. She was found shirtless in the basement, completely drenched with water/cleaning solution. Hypothermic to 80 F. Received a total of 5 L (4 L LR, 1 L NS) placed on Norepinephrine gtt for short period of time (BP 60/40s). Improvement of temperature mental status. DNR/DNI. On NRB and warming blankets in ED, found to have new-onset Afib with RVR. Pt. Found to have R intertrochanteric fracture and a L 9th rib fracture on imaging. Patient also found to have CK of 2400 and +UTI with WBC of 15. HCP: Raven Henry#) High AGMA with acute hypercapneic respiratory failure 2/2 hypothermia (Resolved)      #) Acute comminuted R intertrochanteric femoral fracture - npo after mn - pt and family agreeable for surgery now, pain control, dvt proph, ortho follow up    #) Acute minimally displaced posterior L 9th Rib Fracture - incentive spirometry    #) Mild troponinemia and heart strain 2/2 new-onset paroxsymal AFib (BNP 8476) - converted to NSR - off pressors, amio - not a good candidate for AC  tele monitor until post op 48 hrs  risk strat appreciated by Cardio Dr Britton  may proceed - pt is moderate risk for surgery   echo normal LV function  previous cath - no stents placed    #) Rhabdomyolysis 2/2 mechanical fall - improved ck 800s  off ivfs    #) Multinodular goiter with euthymia  - TFTs wnl    #) 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

## 2018-04-26 NOTE — BEHAVIORAL HEALTH ASSESSMENT NOTE - RISK ASSESSMENT
moderate risk due to chronic medical condition, passsive death wish, being , age, mitigated by no current suicidality, no prior SA, social support from family and access to ER.

## 2018-04-26 NOTE — PROGRESS NOTE ADULT - SUBJECTIVE AND OBJECTIVE BOX
Subjective:      MEDICATIONS  (STANDING):  cefTRIAXone   IVPB      cefTRIAXone   IVPB 1 Gram(s) IV Intermittent every 24 hours  digoxin  Injectable 0.125 milliGRAM(s) IV Push daily  docusate sodium 100 milliGRAM(s) Oral two times a day  enoxaparin Injectable 40 milliGRAM(s) SubCutaneous daily  ferrous    sulfate 325 milliGRAM(s) Oral daily  metoprolol tartrate 12.5 milliGRAM(s) Oral two times a day  pantoprazole    Tablet 40 milliGRAM(s) Oral before breakfast  phenylephrine    Infusion 0.045 MICROgram(s)/kG/Min (0.5 mL/Hr) IV Continuous <Continuous>  senna 2 Tablet(s) Oral at bedtime    MEDICATIONS  (PRN):  morphine  - Injectable 2 milliGRAM(s) IV Push every 4 hours PRN Severe Pain (7 - 10)  oxyCODONE    IR 10 milliGRAM(s) Oral every 4 hours PRN Moderate Pain (4 - 6)            Vital Signs Last 24 Hrs  T(C): 36.4 (26 Apr 2018 05:00), Max: 36.8 (25 Apr 2018 12:00)  T(F): 97.5 (26 Apr 2018 05:00), Max: 98.3 (25 Apr 2018 12:00)  HR: 113 (26 Apr 2018 05:00) (96 - 113)  BP: 118/65 (26 Apr 2018 05:00) (117/75 - 140/74)  BP(mean): 119 (25 Apr 2018 15:30) (83 - 119)  RR: 20 (26 Apr 2018 05:00) (18 - 26)  SpO2: 97% (25 Apr 2018 16:00) (93% - 98%)             REVIEW OF SYSTEMS:  no specific complaint except occasional pain on the fx hip               PHYSICAL EXAM:  · CONSTITUTIONAL: Looks stable, in no respiratory distress  . NECK: Supple, no JVD, no bruit on either carotid side   · RESPIRATORY: Normal air entry to lung base, no wheeze, no crackle, no wet rales  · CARDIOVASCULAR: Normal S1, A2, P2, no murmur, no click, regular rate,  no rub,  · EXTREMITIES: deviated, shortened leg due to fx  · VASCULAR: Pulses are regular, equal, bilateral in upper and lower extremities  	  TELEMETRY: NSR now    ECG: < from: 12 Lead ECG (04.25.18 @ 12:46) >  Diagnosis Line *** Poor data quality, interpretation may be adversely affected  Atrial fibrillation with rapid ventricular response  Low voltage QRS  Nonspecific T wave abnormality    < end of copied text >      TTE: < from: Transthoracic Echocardiogram (04.24.18 @ 09:01) >  1. Severely enlarged left atrium.   2. Severely enlarged right atrium.   3. LV Ejection Fraction by Luis's Method with a biplane EF of 56 %.   4. Normal left ventricular size and wall thicknesses, with normal   systolic and diastolic function.   5. Mild mitral valve regurgitation.   6. Moderate tricuspid regurgitation.   7. Mild aortic regurgitation.   8. Estimated pulmonary artery systolic pressure is 48.4 mmHg assuming a   right atrial pressure of 15 mmHg, which is consistent with mild pulmonary   hypertension.   9. Pulmonary hypertension is present.  10. LA volume Index is 63.1 ml/m² ml/m2.    < end of copied text >      LABS:                        8.0    10.41 )-----------( 190      ( 25 Apr 2018 16:10 )             24.8     04-25    138  |  102  |  30<H>  ----------------------------<  72  4.2   |  16<L>  |  0.5<L>    Ca    7.1<L>      25 Apr 2018 04:40  Mg     1.6     04-25    TPro  3.4<L>  /  Alb  2.0<L>  /  TBili  0.5  /  DBili  0.2  /  AST  69<H>  /  ALT  47<H>  /  AlkPhos  48  04-25    CARDIAC MARKERS ( 25 Apr 2018 16:33 )  x     / x     / 868 U/L / x     / x      CARDIAC MARKERS ( 25 Apr 2018 16:10 )  x     / 0.02 ng/mL / x     / x     / x          PT/INR - ( 25 Apr 2018 04:40 )   PT: 15.20 sec;   INR: 1.40 ratio         PTT - ( 25 Apr 2018 04:40 )  PTT:35.3 sec    I&O's Summary    25 Apr 2018 07:01  -  26 Apr 2018 07:00  --------------------------------------------------------  IN: 470.1 mL / OUT: 1005 mL / NET: -534.9 mL      BNP  RADIOLOGY & ADDITIONAL STUDIES: < from: Xray Chest 1 View- PORTABLE-Routine (04.25.18 @ 05:45) >  Retrocardiac opacity/effusion    < end of copied text >      IMPRESSION AND PLAN:

## 2018-04-26 NOTE — BEHAVIORAL HEALTH ASSESSMENT NOTE - SUMMARY
84 year old female, , domiciled alone, no prior psychiatric history on medical floor s/p admission for hypothermia and right hip fx. Psychiatry consulted as patient has been refusing medical treatment and there is concern for depression.    Patient presents with altered mental status likely delirium secondary to UTI/general medical condition. At this time she denies depressed mood and associated symptoms. She is unable to describe the reason for her hip surgery and risks and benefits of the intervention at this time. She would benefit from discussion of her goals of care with her family and primary team and possibly palliative care. She would also benefit from a competency evaluation to determine if she is able to make medical decisions at this time. Her family reprots that she has been depressed at home. She may benefit from outpatient follow up. See referrral below.

## 2018-04-26 NOTE — BEHAVIORAL HEALTH ASSESSMENT NOTE - NSBHCHARTREVIEWLAB_PSY_A_CORE FT
138  |  102  |  30<H>  ----------------------------<  72  4.2   |  16<L>  |  0.5<L>    Ca    7.1<L>      25 Apr 2018 04:40  Mg     1.6     04-25    TPro  3.4<L>  /  Alb  2.0<L>  /  TBili  0.5  /  DBili  0.2  /  AST  69<H>  /  ALT  47<H>  /  AlkPhos  48  04-25 04-25    138  |  102  |  30<H>  ----------------------------<  72  4.2   |  16<L>  |  0.5<L>    Ca    7.1<L>      25 Apr 2018 04:40  Mg     1.6     04-25    TPro  3.4<L>  /  Alb  2.0<L>  /  TBili  0.5  /  DBili  0.2  /  AST  69<H>  /  ALT  47<H>  /  AlkPhos  48  04-25

## 2018-04-26 NOTE — PROGRESS NOTE ADULT - SUBJECTIVE AND OBJECTIVE BOX
HOSPITALIST ATTENDING NOTE    BOBO, ADELE  84y Female  [id number]    INTERVAL HPI/OVERNIGHT EVENTS: delirious this AM - now back to baseline - discussed with daughter at bedside - pain only upon movement     T(C): 36.4 (04-26-18 @ 05:00), Max: 36.8 (04-25-18 @ 20:48)  HR: 113 (04-26-18 @ 05:00) (101 - 113)  BP: 118/65 (04-26-18 @ 05:00) (118/65 - 140/74)  RR: 20 (04-26-18 @ 05:00) (18 - 26)  SpO2: 97% (04-25-18 @ 16:00) (96% - 97%)  Wt(kg): --    04-25-18 @ 07:01  -  04-26-18 @ 07:00  --------------------------------------------------------  IN: 470.1 mL / OUT: 1005 mL / NET: -534.9 mL        PHYSICAL EXAM:  GENERAL: NAD  HEAD:  Atraumatic, Normocephalic  EYES: EOMI, PERRLA, conjunctiva and sclera clear  NECK: Supple, No JVD, Normal thyroid  NERVOUS SYSTEM:  Alert & Oriented X3, Good concentration; Non-focal- Motor Strength 5/5 B/L upper and lower extremities; except RLE  CHEST/LUNG: Clear to ascultation bilaterally; No rales, rhonchi, wheezing,   HEART: Regular rate and rhythm; No murmurs, rubs, or gallops  ABDOMEN: Soft, Nontender, Nondistended; Bowel sounds present  EXTREMITIES: No clubbing, cyanosis, or bilateral edema 1+ , RLE ext rotated   LYMPH: No lymphadenopathy noted  SKIN: No rashes or lesions  PSYCH: No suicidal/homicial ideation/hallucinations    Consultant(s) Notes Reviewed:  [x ] YES  [ ] NO  Care Discussed with Consultants/Other Providers/ Housestaff [ x] YES  [ ] NO    LABS:                        8.2    11.55 )-----------( 208      ( 26 Apr 2018 11:53 )             25.1     04-25    138  |  102  |  30<H>  ----------------------------<  72  4.2   |  16<L>  |  0.5<L>    Ca    7.1<L>      25 Apr 2018 04:40  Mg     1.6     04-25    TPro  3.4<L>  /  Alb  2.0<L>  /  TBili  0.5  /  DBili  0.2  /  AST  69<H>  /  ALT  47<H>  /  AlkPhos  48  04-25      Culture - Blood (collected 24 Apr 2018 21:22)  Source: .Blood None  Preliminary Report (26 Apr 2018 07:01):    No growth to date.    Culture - Urine (collected 24 Apr 2018 17:50)  Source: .Urine Catheterized  Final Report (25 Apr 2018 23:43):    No growth    Culture - Blood (collected 24 Apr 2018 04:30)  Source: .Blood None  Preliminary Report (25 Apr 2018 15:05):    No growth to date.          RADIOLOGY & ADDITIONAL TESTS:    Imaging or report Personally Reviewed:  [ ] YES  [ ] NO    Case discussed with resident    Care discussed with pt/family      HEALTH ISSUES - PROBLEM Dx:  Delirium due to another medical condition HOSPITALIST ATTENDING NOTE    BOBO, ADELE  84y Female  [id number]    INTERVAL HPI/OVERNIGHT EVENTS: delirious this AM - now back to baseline - discussed with daughter at bedside - pain only upon movement     T(C): 36.4 (04-26-18 @ 05:00), Max: 36.8 (04-25-18 @ 20:48)  HR: 113 (04-26-18 @ 05:00) (101 - 113)  BP: 118/65 (04-26-18 @ 05:00) (118/65 - 140/74)  RR: 20 (04-26-18 @ 05:00) (18 - 26)  SpO2: 97% (04-25-18 @ 16:00) (96% - 97%)  Wt(kg): --    04-25-18 @ 07:01  -  04-26-18 @ 07:00  --------------------------------------------------------  IN: 470.1 mL / OUT: 1005 mL / NET: -534.9 mL        PHYSICAL EXAM:  GENERAL: NAD  HEAD:  Atraumatic, Normocephalic  EYES: EOMI, PERRLA, conjunctiva and sclera clear  NECK: Supple, No JVD, Normal thyroid  NERVOUS SYSTEM:  Alert & Oriented X3, Good concentration; Non-focal- Motor Strength 5/5 B/L upper and lower extremities; except RLE  CHEST/LUNG: Clear to ascultation bilaterally; No rales, rhonchi, wheezing,   HEART: Regular rate and rhythm; No murmurs, rubs, or gallops  ABDOMEN: Soft, Nontender, Nondistended; Bowel sounds present  EXTREMITIES: No clubbing, cyanosis, or bilateral edema 1+ , RLE ext rotated   LYMPH: No lymphadenopathy noted  SKIN: No rashes or lesions  PSYCH: No suicidal/homicial ideation/hallucinations    Consultant(s) Notes Reviewed:  [x ] YES  [ ] NO  Care Discussed with Consultants/Other Providers/ Housestaff [ x] YES  [ ] NO    LABS:                        8.2    11.55 )-----------( 208      ( 26 Apr 2018 11:53 )             25.1     04-25    138  |  102  |  30<H>  ----------------------------<  72  4.2   |  16<L>  |  0.5<L>    Ca    7.1<L>      25 Apr 2018 04:40  Mg     1.6     04-25    TPro  3.4<L>  /  Alb  2.0<L>  /  TBili  0.5  /  DBili  0.2  /  AST  69<H>  /  ALT  47<H>  /  AlkPhos  48  04-25      Culture - Blood (collected 24 Apr 2018 21:22)  Source: .Blood None  Preliminary Report (26 Apr 2018 07:01):    No growth to date.    Culture - Urine (collected 24 Apr 2018 17:50)  Source: .Urine Catheterized  Final Report (25 Apr 2018 23:43):    No growth    Culture - Blood (collected 24 Apr 2018 04:30)  Source: .Blood None  Preliminary Report (25 Apr 2018 15:05):    No growth to date.          RADIOLOGY & ADDITIONAL TESTS:    Imaging or report Personally Reviewed:  [ x] YES  [ ] NO    Case discussed with resident    Care discussed with pt/family      HEALTH ISSUES - PROBLEM Dx:  Delirium due to another medical condition

## 2018-04-26 NOTE — PROGRESS NOTE ADULT - ASSESSMENT
Assessment and Plan  84 y.o female with h/o HTN, syncopal episodes presents s/p apparent fall /w AMS, acute resp failure, hypothermia, skeletal fractures. Stepped down from ICU for ongoing management of R trochanteric fracture, rhabdomyolysis, new-onset A-fib /w RVR.    # Anion Gap Metabolic Acidosis /w acute hypercapneic respiratory failure + hypothermia  -resolved    # Acute Commuted R intertrochanteric femoral fracture  - requires cardiac and mechanical clearance/risk stratification for R ORIF    # Acute minimally displaced L 9th rib fracture  - ortho seen, no intervention at this time    # Mild tropininemia and heart strain 2/2 new onset a-fib /w RVR (BNP 8476)  -digoxin loading dose as per Dr. Britton  -trend cardiac enzymes  -no A/C due to fall risk  -cardiac consult pending    #Rhabdomyolysis 2/2 mechanical fall  - CPK downtrending (868 from ~2700)  - monitor    #Multinodular goiter (euthyroid)  -f/u as outpatient    #Mixed anemia: THIERRY + Anemia of chronic disease  -started on iron supplementation    #Mechanical fall (syncope vs cardiac arrythmia vs mechanical heart defect, unlikely vasovagal)    #Acute metabolic encephalopathy 2/2 acute bacterial cystitis  -day kept in for ongoing urinary retention  -c/w rocephin for bacteriuria    DNR/DNI Assessment and Plan  84 y.o female with h/o HTN, syncopal episodes presents s/p apparent fall /w AMS, acute resp failure, hypothermia, skeletal fractures. Stepped down from ICU for ongoing management of R trochanteric fracture, rhabdomyolysis, new-onset A-fib /w RVR.    # Anion Gap Metabolic Acidosis /w acute hypercapneic respiratory failure + hypothermia  -resolved    # Acute Commuted R intertrochanteric femoral fracture  - requires cardiac and mechanical clearance/risk stratification for R ORIF    # Acute minimally displaced L 9th rib fracture  - ortho seen, no intervention at this time    # Mild tropininemia and heart strain 2/2 new onset a-fib /w RVR (BNP 8476)  -digoxin loading dose as per Dr. Britton  -trend cardiac enzymes  -no A/C due to fall risk  -cardiac consult pending    #Rhabdomyolysis 2/2 mechanical fall  - CPK downtrending (868 from ~2700)  - monitor    #Multinodular goiter (euthyroid)  -f/u as outpatient    #Mixed anemia: THIERRY + Anemia of chronic disease  -started on iron supplementation    #Mechanical fall (syncope vs cardiac arrythmia vs mechanical heart defect, unlikely vasovagal)    #Acute metabolic encephalopathy 2/2 acute bacterial cystitis  -day kept in for ongoing urinary retention  -c/w rocephin for bacteriuria    #Psychosocial Eval  - pt states she does not desire any further tests/interventions, is ready to "go upstairs"  - psych eval pending    DNR/DNI Assessment and Plan  84 y.o female with h/o HTN, syncopal episodes presents s/p apparent fall /w AMS, acute resp failure, hypothermia, skeletal fractures. Stepped down from ICU for ongoing management of R trochanteric fracture, rhabdomyolysis, new-onset A-fib /w RVR.    # Anion Gap Metabolic Acidosis /w acute hypercapneic respiratory failure + hypothermia  -resolved    # Acute Commuted R intertrochanteric femoral fracture  - cleared by cardio for ORIF.   - per surgery, transfuse 1 unit prbc to have hemoglobin ~ 10    # Acute minimally displaced L 9th rib fracture  - no intervention at this time    #Paroxysmal Atrial Fibrillation  -digoxin loading dose as per Dr. Britton  -no A/C due to fall risk    #Thrombophlebitis  -per RN, both IV lines infiltrated with purulent discharge from the site of IV.    -Urban stat blood cultures and started patient on Unasyn after blood cultures  -purulent discharge collected and sent for wound cultures.     #Decreased urine output  -175 cc over 8 hours  -Bladder scan was negative for clogged day or obstruction.  Pt has Day  -started NS @ 75 cc/hr as pt appears dehydrated.     #Rhabdomyolysis 2/2 mechanical fall  - CPK downtrending (868 from ~2700)  - monitor    #Multinodular goiter (euthyroid)  -f/u as outpatient    #Mixed anemia: THIERRY + Anemia of chronic disease  -started on iron supplementation    #Acute metabolic encephalopathy 2/2 acute bacterial cystitis  -day for ongoing urinary retention  -c/w rocephin for bacteriuria    #Psychosocial Eval  - pt states she does not desire any further tests/interventions, is ready to "go upstairs"  - psych eval recommends pt is not depressed and is in acute delirious state.  No medications at this time.  - after discussions with patietn and family at bedside, pt is agreeable to surgery and medical intervention.      DNR/DNI

## 2018-04-26 NOTE — PROGRESS NOTE ADULT - SUBJECTIVE AND OBJECTIVE BOX
SUBJECTIVE:    Patient is a 84y old Female who presents with a chief complaint of hypothermia, AMS, R hip fracture  Currently admitted to medicine with the primary diagnosis of Hypothermia, rhabdomyolysis, fractures of R hip and L 9th rib     Today is hospital day 3d.   OVERNIGHT EVENTS  Patient is breathing comfortably on room air this morning. There we no events on tele, no acute events overnight. Patient reports good sleep and appetite. She has no symptoms, no new complaints, denies pain.    PAST MEDICAL & SURGICAL HISTORY  HTN (hypertension)    ALLERGIES:  No Known Allergies    MEDICATIONS:  STANDING MEDICATIONS  cefTRIAXone   IVPB      cefTRIAXone   IVPB 1 Gram(s) IV Intermittent every 24 hours  digoxin  Injectable 0.125 milliGRAM(s) IV Push daily  docusate sodium 100 milliGRAM(s) Oral two times a day  enoxaparin Injectable 40 milliGRAM(s) SubCutaneous daily  ferrous    sulfate 325 milliGRAM(s) Oral daily  metoprolol tartrate 12.5 milliGRAM(s) Oral two times a day  pantoprazole    Tablet 40 milliGRAM(s) Oral before breakfast  phenylephrine    Infusion 0.045 MICROgram(s)/kG/Min IV Continuous <Continuous>  senna 2 Tablet(s) Oral at bedtime    PRN MEDICATIONS  morphine  - Injectable 2 milliGRAM(s) IV Push every 4 hours PRN  oxyCODONE    IR 10 milliGRAM(s) Oral every 4 hours PRN    VITALS:   T(F): 97.5  HR: 113  BP: 118/65  RR: 20  SpO2: 97%      LABS:                        8.0    10.41 )-----------( 190      ( 25 Apr 2018 16:10 )             24.8     04-25    138  |  102  |  30<H>  ----------------------------<  72  4.2   |  16<L>  |  0.5<L>    Ca    7.1<L>      25 Apr 2018 04:40  Mg     1.6     04-25    TPro  3.4<L>  /  Alb  2.0<L>  /  TBili  0.5  /  DBili  0.2  /  AST  69<H>  /  ALT  47<H>  /  AlkPhos  48  04-25    PT/INR - ( 25 Apr 2018 04:40 )   PT: 15.20 sec;   INR: 1.40 ratio         PTT - ( 25 Apr 2018 04:40 )  PTT:35.3 sec  Urinalysis Basic - ( 24 Apr 2018 17:50 )    Color: Yellow / Appearance: Clear / SG: >=1.030 / pH: x  Gluc: x / Ketone: 40  / Bili: Small / Urobili: 0.2   Blood: x / Protein: 30 / Nitrite: Negative   Leuk Esterase: Small / RBC: 5-10 /HPF / WBC 6-10 /HPF   Sq Epi: x / Non Sq Epi: Few /HPF / Bacteria: x    ABG - ( 25 Apr 2018 12:33 )  pH, Arterial: 7.43  pH, Blood: x     /  pCO2: 33    /  pO2: 67    / HCO3: 22    / Base Excess: -2.1  /  SaO2: 95        Creatine Kinase, Serum: 868 U/L <H> (04-25-18 @ 16:33)  Troponin T, Serum: 0.02 ng/mL <H> (04-25-18 @ 16:10)    Culture - Blood (collected 24 Apr 2018 21:22)  Source: .Blood None  Preliminary Report (26 Apr 2018 07:01):    No growth to date.    Culture - Urine (collected 24 Apr 2018 17:50)  Source: .Urine Catheterized  Final Report (25 Apr 2018 23:43):    No growth    Culture - Blood (collected 24 Apr 2018 04:30)  Source: .Blood None  Preliminary Report (25 Apr 2018 15:05):    No growth to date.    Culture - Urine (collected 23 Apr 2018 12:34)  Source: .Urine Catheterized  Final Report (25 Apr 2018 17:16):    >100,000 CFU/ml Escherichia coli  Organism: Escherichia coli (25 Apr 2018 17:16)  Organism: Escherichia coli (25 Apr 2018 17:16)      CARDIAC MARKERS ( 25 Apr 2018 16:33 )  x     / x     / 868 U/L / x     / x      CARDIAC MARKERS ( 25 Apr 2018 16:10 )  x     / 0.02 ng/mL / x     / x     / x          RADIOLOGY:    (4/23)   CT Head: Neg  CT Spine: Neg  CT Abd/Pelvis: R trochanteric fracture  CXR: Fracture of L 9th rib                 PHYSICAL EXAM:  GEN:   LUNGS:   HEART:   ABD:   EXT:   NEURO: SUBJECTIVE:    Patient is a 84y old Female who presents with a chief complaint of hypothermia, AMS, R hip fracture  Currently admitted to medicine with the primary diagnosis of Hypothermia, rhabdomyolysis, fractures of R hip and L 9th rib     Today is hospital day 3d.   OVERNIGHT EVENTS  Patient is breathing comfortably on room air this morning. There we no events on tele, no acute events overnight. Patient reports good sleep and appetite. She has no symptoms, no new complaints, denies pain.    PAST MEDICAL & SURGICAL HISTORY  HTN (hypertension)    ALLERGIES:  No Known Allergies    MEDICATIONS:  STANDING MEDICATIONS  cefTRIAXone   IVPB      cefTRIAXone   IVPB 1 Gram(s) IV Intermittent every 24 hours  digoxin  Injectable 0.125 milliGRAM(s) IV Push daily  docusate sodium 100 milliGRAM(s) Oral two times a day  enoxaparin Injectable 40 milliGRAM(s) SubCutaneous daily  ferrous    sulfate 325 milliGRAM(s) Oral daily  metoprolol tartrate 12.5 milliGRAM(s) Oral two times a day  pantoprazole    Tablet 40 milliGRAM(s) Oral before breakfast  phenylephrine    Infusion 0.045 MICROgram(s)/kG/Min IV Continuous <Continuous>  senna 2 Tablet(s) Oral at bedtime    PRN MEDICATIONS  morphine  - Injectable 2 milliGRAM(s) IV Push every 4 hours PRN  oxyCODONE    IR 10 milliGRAM(s) Oral every 4 hours PRN    VITALS:   T(F): 97.5  HR: 113  BP: 118/65  RR: 20  SpO2: 97%      LABS:                        8.0    10.41 )-----------( 190      ( 25 Apr 2018 16:10 )             24.8     04-25    138  |  102  |  30<H>  ----------------------------<  72  4.2   |  16<L>  |  0.5<L>    Ca    7.1<L>      25 Apr 2018 04:40  Mg     1.6     04-25    TPro  3.4<L>  /  Alb  2.0<L>  /  TBili  0.5  /  DBili  0.2  /  AST  69<H>  /  ALT  47<H>  /  AlkPhos  48  04-25    PT/INR - ( 25 Apr 2018 04:40 )   PT: 15.20 sec;   INR: 1.40 ratio         PTT - ( 25 Apr 2018 04:40 )  PTT:35.3 sec  Urinalysis Basic - ( 24 Apr 2018 17:50 )    Color: Yellow / Appearance: Clear / SG: >=1.030 / pH: x  Gluc: x / Ketone: 40  / Bili: Small / Urobili: 0.2   Blood: x / Protein: 30 / Nitrite: Negative   Leuk Esterase: Small / RBC: 5-10 /HPF / WBC 6-10 /HPF   Sq Epi: x / Non Sq Epi: Few /HPF / Bacteria: x    ABG - ( 25 Apr 2018 12:33 )  pH, Arterial: 7.43  pH, Blood: x     /  pCO2: 33    /  pO2: 67    / HCO3: 22    / Base Excess: -2.1  /  SaO2: 95        Creatine Kinase, Serum: 868 U/L <H> (04-25-18 @ 16:33)  Troponin T, Serum: 0.02 ng/mL <H> (04-25-18 @ 16:10)    Culture - Blood (collected 24 Apr 2018 21:22)  Source: .Blood None  Preliminary Report (26 Apr 2018 07:01):    No growth to date.    Culture - Urine (collected 24 Apr 2018 17:50)  Source: .Urine Catheterized  Final Report (25 Apr 2018 23:43):    No growth    Culture - Blood (collected 24 Apr 2018 04:30)  Source: .Blood None  Preliminary Report (25 Apr 2018 15:05):    No growth to date.    Culture - Urine (collected 23 Apr 2018 12:34)  Source: .Urine Catheterized  Final Report (25 Apr 2018 17:16):    >100,000 CFU/ml Escherichia coli  Organism: Escherichia coli (25 Apr 2018 17:16)  Organism: Escherichia coli (25 Apr 2018 17:16)      CARDIAC MARKERS ( 25 Apr 2018 16:33 )  x     / x     / 868 U/L / x     / x      CARDIAC MARKERS ( 25 Apr 2018 16:10 )  x     / 0.02 ng/mL / x     / x     / x                                8.7    11.08 )-----------( 221      ( 26 Apr 2018 15:16 )             26.4   04-26    141  |  105  |  26<H>  ----------------------------<  126<H>  4.6   |  25  |  0.7    Ca    7.9<L>      26 Apr 2018 15:16  Mg     2.2     04-26    TPro  3.4<L>  /  Alb  2.0<L>  /  TBili  0.5  /  DBili  0.2  /  AST  69<H>  /  ALT  47<H>  /  AlkPhos  48  04-25        RADIOLOGY:    (4/23)   CT Head: Neg  CT Spine: Neg  CT Abd/Pelvis: R trochanteric fracture  CXR: Fracture of L 9th rib                 PHYSICAL EXAM:  GEN: awake, alert  LUNGS: ctab  HEART: regular rate  ABD: soft non tender  EXT: right upper and left upper extremity erythema surrounding IV sites

## 2018-04-26 NOTE — BEHAVIORAL HEALTH ASSESSMENT NOTE - NSBHCONSULTFOLLOWAFTERCARE_PSY_A_CORE FT
[facility] [place] [phone]  [facility] Outpatient Psychiatry department: [phone]

## 2018-04-26 NOTE — BEHAVIORAL HEALTH ASSESSMENT NOTE - NSBHHPIREASONCL_PSY_A_CORE
Pt educated on safe sex practices  Educated on when to contact doctor and to inform partners of diagnosis       Giulia Dixon RN  11/11/17 [id number] depression

## 2018-04-27 LAB
ANION GAP SERPL CALC-SCNC: 12 MMOL/L — SIGNIFICANT CHANGE UP (ref 7–14)
APTT BLD: 28.1 SEC — SIGNIFICANT CHANGE UP (ref 27–39.2)
BASOPHILS # BLD AUTO: 0.03 K/UL — SIGNIFICANT CHANGE UP (ref 0–0.2)
BASOPHILS NFR BLD AUTO: 0.3 % — SIGNIFICANT CHANGE UP (ref 0–1)
BUN SERPL-MCNC: 21 MG/DL — HIGH (ref 10–20)
CALCIUM SERPL-MCNC: 7.6 MG/DL — LOW (ref 8.5–10.1)
CHLORIDE SERPL-SCNC: 107 MMOL/L — SIGNIFICANT CHANGE UP (ref 98–110)
CO2 SERPL-SCNC: 24 MMOL/L — SIGNIFICANT CHANGE UP (ref 17–32)
CREAT SERPL-MCNC: 0.5 MG/DL — LOW (ref 0.7–1.5)
EOSINOPHIL # BLD AUTO: 0.09 K/UL — SIGNIFICANT CHANGE UP (ref 0–0.7)
EOSINOPHIL NFR BLD AUTO: 0.8 % — SIGNIFICANT CHANGE UP (ref 0–8)
GLUCOSE SERPL-MCNC: 102 MG/DL — HIGH (ref 70–99)
HCT VFR BLD CALC: 30.5 % — LOW (ref 37–47)
HCT VFR BLD CALC: 30.8 % — LOW (ref 37–47)
HGB BLD-MCNC: 10.1 G/DL — LOW (ref 12–16)
HGB BLD-MCNC: 10.1 G/DL — LOW (ref 12–16)
IMM GRANULOCYTES NFR BLD AUTO: 2.5 % — HIGH (ref 0.1–0.3)
INR BLD: 1.15 RATIO — SIGNIFICANT CHANGE UP (ref 0.65–1.3)
LYMPHOCYTES # BLD AUTO: 1.26 K/UL — SIGNIFICANT CHANGE UP (ref 1.2–3.4)
LYMPHOCYTES # BLD AUTO: 11.1 % — LOW (ref 20.5–51.1)
MAGNESIUM SERPL-MCNC: 2.1 MG/DL — SIGNIFICANT CHANGE UP (ref 1.8–2.4)
MCHC RBC-ENTMCNC: 30.3 PG — SIGNIFICANT CHANGE UP (ref 27–31)
MCHC RBC-ENTMCNC: 30.5 PG — SIGNIFICANT CHANGE UP (ref 27–31)
MCHC RBC-ENTMCNC: 32.8 G/DL — SIGNIFICANT CHANGE UP (ref 32–37)
MCHC RBC-ENTMCNC: 33.1 G/DL — SIGNIFICANT CHANGE UP (ref 32–37)
MCV RBC AUTO: 92.1 FL — SIGNIFICANT CHANGE UP (ref 81–99)
MCV RBC AUTO: 92.5 FL — SIGNIFICANT CHANGE UP (ref 81–99)
MONOCYTES # BLD AUTO: 1.09 K/UL — HIGH (ref 0.1–0.6)
MONOCYTES NFR BLD AUTO: 9.6 % — HIGH (ref 1.7–9.3)
NEUTROPHILS # BLD AUTO: 8.63 K/UL — HIGH (ref 1.4–6.5)
NEUTROPHILS NFR BLD AUTO: 75.7 % — HIGH (ref 42.2–75.2)
NRBC # BLD: 0 /100 WBCS — SIGNIFICANT CHANGE UP (ref 0–0)
NRBC # BLD: 0 /100 WBCS — SIGNIFICANT CHANGE UP (ref 0–0)
PLATELET # BLD AUTO: 193 K/UL — SIGNIFICANT CHANGE UP (ref 130–400)
PLATELET # BLD AUTO: 202 K/UL — SIGNIFICANT CHANGE UP (ref 130–400)
POTASSIUM SERPL-MCNC: 4.3 MMOL/L — SIGNIFICANT CHANGE UP (ref 3.5–5)
POTASSIUM SERPL-SCNC: 4.3 MMOL/L — SIGNIFICANT CHANGE UP (ref 3.5–5)
PROTHROM AB SERPL-ACNC: 12.5 SEC — SIGNIFICANT CHANGE UP (ref 9.95–12.87)
RBC # BLD: 3.31 M/UL — LOW (ref 4.2–5.4)
RBC # BLD: 3.33 M/UL — LOW (ref 4.2–5.4)
RBC # FLD: 15.2 % — HIGH (ref 11.5–14.5)
RBC # FLD: 15.5 % — HIGH (ref 11.5–14.5)
SODIUM SERPL-SCNC: 143 MMOL/L — SIGNIFICANT CHANGE UP (ref 135–146)
WBC # BLD: 11.39 K/UL — HIGH (ref 4.8–10.8)
WBC # BLD: 11.81 K/UL — HIGH (ref 4.8–10.8)
WBC # FLD AUTO: 11.39 K/UL — HIGH (ref 4.8–10.8)
WBC # FLD AUTO: 11.81 K/UL — HIGH (ref 4.8–10.8)

## 2018-04-27 RX ORDER — ENOXAPARIN SODIUM 100 MG/ML
40 INJECTION SUBCUTANEOUS DAILY
Qty: 0 | Refills: 0 | Status: DISCONTINUED | OUTPATIENT
Start: 2018-04-27 | End: 2018-05-03

## 2018-04-27 RX ORDER — OXYCODONE HYDROCHLORIDE 5 MG/1
5 TABLET ORAL EVERY 6 HOURS
Qty: 0 | Refills: 0 | Status: DISCONTINUED | OUTPATIENT
Start: 2018-04-27 | End: 2018-05-03

## 2018-04-27 RX ORDER — DOCUSATE SODIUM 100 MG
100 CAPSULE ORAL
Qty: 0 | Refills: 0 | Status: DISCONTINUED | OUTPATIENT
Start: 2018-04-27 | End: 2018-05-03

## 2018-04-27 RX ORDER — MEPERIDINE HYDROCHLORIDE 50 MG/ML
12.5 INJECTION INTRAMUSCULAR; INTRAVENOUS; SUBCUTANEOUS ONCE
Qty: 0 | Refills: 0 | Status: DISCONTINUED | OUTPATIENT
Start: 2018-04-27 | End: 2018-04-27

## 2018-04-27 RX ORDER — ONDANSETRON 8 MG/1
4 TABLET, FILM COATED ORAL ONCE
Qty: 0 | Refills: 0 | Status: DISCONTINUED | OUTPATIENT
Start: 2018-04-27 | End: 2018-04-27

## 2018-04-27 RX ORDER — MORPHINE SULFATE 50 MG/1
1 CAPSULE, EXTENDED RELEASE ORAL
Qty: 0 | Refills: 0 | Status: DISCONTINUED | OUTPATIENT
Start: 2018-04-27 | End: 2018-04-27

## 2018-04-27 RX ORDER — FERROUS SULFATE 325(65) MG
325 TABLET ORAL DAILY
Qty: 0 | Refills: 0 | Status: DISCONTINUED | OUTPATIENT
Start: 2018-04-27 | End: 2018-05-03

## 2018-04-27 RX ORDER — DIGOXIN 250 MCG
0.12 TABLET ORAL DAILY
Qty: 0 | Refills: 0 | Status: DISCONTINUED | OUTPATIENT
Start: 2018-04-27 | End: 2018-05-03

## 2018-04-27 RX ORDER — SODIUM CHLORIDE 9 MG/ML
1000 INJECTION, SOLUTION INTRAVENOUS
Qty: 0 | Refills: 0 | Status: DISCONTINUED | OUTPATIENT
Start: 2018-04-27 | End: 2018-04-27

## 2018-04-27 RX ORDER — DIGOXIN 250 MCG
0.12 TABLET ORAL DAILY
Qty: 0 | Refills: 0 | Status: DISCONTINUED | OUTPATIENT
Start: 2018-04-27 | End: 2018-04-27

## 2018-04-27 RX ORDER — CEFTRIAXONE 500 MG/1
1 INJECTION, POWDER, FOR SOLUTION INTRAMUSCULAR; INTRAVENOUS EVERY 24 HOURS
Qty: 0 | Refills: 0 | Status: DISCONTINUED | OUTPATIENT
Start: 2018-04-27 | End: 2018-04-29

## 2018-04-27 RX ORDER — PANTOPRAZOLE SODIUM 20 MG/1
40 TABLET, DELAYED RELEASE ORAL
Qty: 0 | Refills: 0 | Status: DISCONTINUED | OUTPATIENT
Start: 2018-04-27 | End: 2018-05-03

## 2018-04-27 RX ORDER — MORPHINE SULFATE 50 MG/1
2 CAPSULE, EXTENDED RELEASE ORAL EVERY 4 HOURS
Qty: 0 | Refills: 0 | Status: DISCONTINUED | OUTPATIENT
Start: 2018-04-27 | End: 2018-05-03

## 2018-04-27 RX ORDER — CEFAZOLIN SODIUM 1 G
1000 VIAL (EA) INJECTION EVERY 8 HOURS
Qty: 0 | Refills: 0 | Status: COMPLETED | OUTPATIENT
Start: 2018-04-27 | End: 2018-04-28

## 2018-04-27 RX ORDER — SENNA PLUS 8.6 MG/1
2 TABLET ORAL AT BEDTIME
Qty: 0 | Refills: 0 | Status: DISCONTINUED | OUTPATIENT
Start: 2018-04-27 | End: 2018-05-03

## 2018-04-27 RX ORDER — AMPICILLIN SODIUM AND SULBACTAM SODIUM 250; 125 MG/ML; MG/ML
3 INJECTION, POWDER, FOR SUSPENSION INTRAMUSCULAR; INTRAVENOUS EVERY 6 HOURS
Qty: 0 | Refills: 0 | Status: DISCONTINUED | OUTPATIENT
Start: 2018-04-27 | End: 2018-04-28

## 2018-04-27 RX ORDER — METOPROLOL TARTRATE 50 MG
12.5 TABLET ORAL
Qty: 0 | Refills: 0 | Status: DISCONTINUED | OUTPATIENT
Start: 2018-04-27 | End: 2018-04-30

## 2018-04-27 RX ADMIN — AMPICILLIN SODIUM AND SULBACTAM SODIUM 200 GRAM(S): 250; 125 INJECTION, POWDER, FOR SUSPENSION INTRAMUSCULAR; INTRAVENOUS at 23:54

## 2018-04-27 RX ADMIN — SENNA PLUS 2 TABLET(S): 8.6 TABLET ORAL at 22:27

## 2018-04-27 RX ADMIN — AMPICILLIN SODIUM AND SULBACTAM SODIUM 200 GRAM(S): 250; 125 INJECTION, POWDER, FOR SUSPENSION INTRAMUSCULAR; INTRAVENOUS at 00:28

## 2018-04-27 RX ADMIN — PANTOPRAZOLE SODIUM 40 MILLIGRAM(S): 20 TABLET, DELAYED RELEASE ORAL at 06:33

## 2018-04-27 RX ADMIN — Medication 12.5 MILLIGRAM(S): at 18:05

## 2018-04-27 RX ADMIN — Medication 100 MILLIGRAM(S): at 22:27

## 2018-04-27 RX ADMIN — AMPICILLIN SODIUM AND SULBACTAM SODIUM 200 GRAM(S): 250; 125 INJECTION, POWDER, FOR SUSPENSION INTRAMUSCULAR; INTRAVENOUS at 06:30

## 2018-04-27 RX ADMIN — Medication 12.5 MILLIGRAM(S): at 06:33

## 2018-04-27 RX ADMIN — Medication 0.12 MILLIGRAM(S): at 11:18

## 2018-04-27 RX ADMIN — AMPICILLIN SODIUM AND SULBACTAM SODIUM 200 GRAM(S): 250; 125 INJECTION, POWDER, FOR SUSPENSION INTRAMUSCULAR; INTRAVENOUS at 11:17

## 2018-04-27 RX ADMIN — CEFTRIAXONE 100 GRAM(S): 500 INJECTION, POWDER, FOR SOLUTION INTRAMUSCULAR; INTRAVENOUS at 22:27

## 2018-04-27 RX ADMIN — AMPICILLIN SODIUM AND SULBACTAM SODIUM 200 GRAM(S): 250; 125 INJECTION, POWDER, FOR SUSPENSION INTRAMUSCULAR; INTRAVENOUS at 18:05

## 2018-04-27 RX ADMIN — Medication 325 MILLIGRAM(S): at 11:18

## 2018-04-27 RX ADMIN — Medication 100 MILLIGRAM(S): at 06:33

## 2018-04-27 NOTE — BRIEF OPERATIVE NOTE - PROCEDURE
<<-----Click on this checkbox to enter Procedure Intramedullary rodding of intertrochanteric or subtrochanteric fracture of femur  04/27/2018    Active  [id number]

## 2018-04-27 NOTE — PRE-OP CHECKLIST - 1.
pt topre op accompanied by family, pt alert,bilateral arms swollen ,multiple red and swollen areas noted on bilateral arms,day to bsd draining clear maggie urine

## 2018-04-27 NOTE — PROGRESS NOTE ADULT - SUBJECTIVE AND OBJECTIVE BOX
SUBJECTIVE:    Patient is a 84y old Female who presents with a chief complaint of AMS, hypothermia  Currently admitted to medicine with the primary diagnosis of R hip fracture     Today is hospital day 4d.   OVERNIGHT EVENTS  Patient is breathing comfortably on room air this morning. No acute events overnight, no events on tele. No new complaints. Patient reports good sleep and appetite. She is non-ambulatory.    PAST MEDICAL & SURGICAL HISTORY  HTN (hypertension)    ALLERGIES:  No Known Allergies    MEDICATIONS:  STANDING MEDICATIONS  ampicillin/sulbactam  IVPB 3 Gram(s) IV Intermittent every 6 hours  cefTRIAXone   IVPB 1 Gram(s) IV Intermittent every 24 hours  digoxin  Injectable 0.125 milliGRAM(s) IV Push daily  docusate sodium 100 milliGRAM(s) Oral two times a day  enoxaparin Injectable 40 milliGRAM(s) SubCutaneous daily  ferrous    sulfate 325 milliGRAM(s) Oral daily  metoprolol tartrate 12.5 milliGRAM(s) Oral two times a day  pantoprazole    Tablet 40 milliGRAM(s) Oral before breakfast  senna 2 Tablet(s) Oral at bedtime  sodium chloride 0.9%. 1000 milliLiter(s) IV Continuous <Continuous>    PRN MEDICATIONS  morphine  - Injectable 2 milliGRAM(s) IV Push every 4 hours PRN  oxyCODONE    IR 10 milliGRAM(s) Oral every 4 hours PRN    VITALS:   T(F): 97  HR: 87  BP: 153/82  RR: 18  SpO2: --    LABS:                        10.1   11.39 )-----------( 193      ( 27 Apr 2018 07:08 )             30.8     04-26    141  |  105  |  26<H>  ----------------------------<  126<H>  4.6   |  25  |  0.7    Ca    7.9<L>      26 Apr 2018 15:16  Mg     2.2     04-26      PT/INR - ( 27 Apr 2018 07:08 )   PT: 12.50 sec;   INR: 1.15 ratio         PTT - ( 27 Apr 2018 07:08 )  PTT:28.1 sec    ABG - ( 25 Apr 2018 12:33 )  pH, Arterial: 7.43  pH, Blood: x     /  pCO2: 33    /  pO2: 67    / HCO3: 22    / Base Excess: -2.1  /  SaO2: 95        Culture - Blood (collected 24 Apr 2018 21:22)  Source: .Blood None  Preliminary Report (26 Apr 2018 07:01):    No growth to date.    Culture - Urine (collected 24 Apr 2018 17:50)  Source: .Urine Catheterized  Final Report (25 Apr 2018 23:43):    No growth      CARDIAC MARKERS ( 25 Apr 2018 16:33 )  x     / x     / 868 U/L / x     / x      CARDIAC MARKERS ( 25 Apr 2018 16:10 )  x     / 0.02 ng/mL / x     / x     / x          RADIOLOGY:    PHYSICAL EXAM:  GEN:   LUNGS:   HEART:   ABD:   EXT:   NEURO: SUBJECTIVE:    Patient is a 84y old Female who presents with a chief complaint of AMS, hypothermia  Currently admitted to medicine with the primary diagnosis of R hip fracture     Today is hospital day 4d.   OVERNIGHT EVENTS  Patient is breathing comfortably on room air this morning. No acute events overnight, no events on tele. No new complaints. Patient reports good sleep and appetite. She is non-ambulatory.    PAST MEDICAL & SURGICAL HISTORY  HTN (hypertension)    ALLERGIES:  No Known Allergies    MEDICATIONS:  STANDING MEDICATIONS  ampicillin/sulbactam  IVPB 3 Gram(s) IV Intermittent every 6 hours  cefTRIAXone   IVPB 1 Gram(s) IV Intermittent every 24 hours  digoxin  Injectable 0.125 milliGRAM(s) IV Push daily  docusate sodium 100 milliGRAM(s) Oral two times a day  enoxaparin Injectable 40 milliGRAM(s) SubCutaneous daily  ferrous    sulfate 325 milliGRAM(s) Oral daily  metoprolol tartrate 12.5 milliGRAM(s) Oral two times a day  pantoprazole    Tablet 40 milliGRAM(s) Oral before breakfast  senna 2 Tablet(s) Oral at bedtime  sodium chloride 0.9%. 1000 milliLiter(s) IV Continuous <Continuous>    PRN MEDICATIONS  morphine  - Injectable 2 milliGRAM(s) IV Push every 4 hours PRN  oxyCODONE    IR 10 milliGRAM(s) Oral every 4 hours PRN    VITALS:   T(F): 97  HR: 87  BP: 153/82  RR: 18  SpO2: --    LABS:                        10.1   11.39 )-----------( 193      ( 27 Apr 2018 07:08 )             30.8     04-26    141  |  105  |  26<H>  ----------------------------<  126<H>  4.6   |  25  |  0.7    Ca    7.9<L>      26 Apr 2018 15:16  Mg     2.2     04-26      PT/INR - ( 27 Apr 2018 07:08 )   PT: 12.50 sec;   INR: 1.15 ratio         PTT - ( 27 Apr 2018 07:08 )  PTT:28.1 sec    ABG - ( 25 Apr 2018 12:33 )  pH, Arterial: 7.43  pH, Blood: x     /  pCO2: 33    /  pO2: 67    / HCO3: 22    / Base Excess: -2.1  /  SaO2: 95        Culture - Blood (collected 24 Apr 2018 21:22)  Source: .Blood None  Preliminary Report (26 Apr 2018 07:01):    No growth to date.    Culture - Urine (collected 24 Apr 2018 17:50)  Source: .Urine Catheterized  Final Report (25 Apr 2018 23:43):    No growth      CARDIAC MARKERS ( 25 Apr 2018 16:33 )  x     / x     / 868 U/L / x     / x      CARDIAC MARKERS ( 25 Apr 2018 16:10 )  x     / 0.02 ng/mL / x     / x     / x          RADIOLOGY:    PHYSICAL EXAM:  GEN: awake, alert  LUNGS: ctab  HEART: regular rate  ABD: soft non tender  EXT: right upper and left upper extremity erythema surrounding IV sites  NEURO: oriented x 3

## 2018-04-27 NOTE — PROGRESS NOTE ADULT - ASSESSMENT
Assessment and Plan  84 y.o female with h/o HTN, syncopal episodes presents s/p apparent fall /w AMS, acute resp failure, hypothermia, skeletal fractures. Stepped down from ICU for ongoing management of R trochanteric fracture, rhabdomyolysis, new-onset A-fib /w RVR.    # Anion Gap Metabolic Acidosis /w acute hypercapneic respiratory failure + hypothermia  -resolved    # Acute Commuted R intertrochanteric femoral fracture  - cleared by cardio for ORIF.   - s/p transfusion 1 unit pRBCs, HgB = 10.1  - ORIF planned for this afternoon  - c/w tele until 48 hours post-op    # Acute minimally displaced L 9th rib fracture  - no intervention at this time    #Paroxysmal Atrial Fibrillation  -c/w digoxin  -no A/C due to fall risk    #Thrombophlebitis  -per RN, both IV lines infiltrated with purulent discharge from the site of IV.    -c/w Unasyn  -f/u wound cultures    #Decreased urine output  -175 cc over 8 hours  -Bladder scan was negative for clogged day or obstruction.  Pt has Day  -started NS @ 75 cc/hr as pt appears dehydrated.     #Rhabdomyolysis 2/2 mechanical fall  - CPK downtrending (868 from ~2700)  - monitor    #Multinodular goiter (euthyroid)  -f/u as outpatient    #Mixed anemia: THIERRY + Anemia of chronic disease  -started on iron supplementation    #Acute metabolic encephalopathy 2/2 acute bacterial cystitis  -day for ongoing urinary retention  -c/w rocephin for bacteriuria    #Psychosocial Eval  - pt AOx3, acute confusional state resolved, pt at baseline cleared by psych, no psych meds at this time  - after discussions with patient and family at bedside, pt is agreeable to surgery and medical intervention.      DNR/DNI 84 y.o female with h/o HTN, syncopal episodes presents s/p apparent fall /w AMS, acute resp failure, hypothermia, skeletal fractures. Stepped down from ICU for ongoing management of R trochanteric fracture, rhabdomyolysis, new-onset A-fib /w RVR.    # Anion Gap Metabolic Acidosis /w acute hypercapneic respiratory failure + hypothermia  -resolved    # Acute Commuted R intertrochanteric femoral fracture  - s/p transfusion 1 unit pRBCs, HgB = 10.1  - ORIF planned for this afternoon  - c/w tele until 48 hours post-op  - PT rehab.      # Acute minimally displaced L 9th rib fracture  - no intervention at this time    #Paroxysmal Atrial Fibrillation  -changed digoxin from IV to pO  -no A/C due to fall risk    #Thrombophlebitis  -per RN, both IV lines infiltrated with purulent discharge from the site of IV.    -c/w Unasyn  -f/u wound cultures    #Decreased urine output: resolved. Due to decreased hypovolemia  -NS @ 75 cc/hr    #Rhabdomyolysis 2/2 mechanical fall: resolved  - CPK downtrending (868 from ~2700)    #Multinodular goiter (euthyroid)  -f/u as outpatient    #Mixed anemia: THIERRY + Anemia of chronic disease  -started on iron supplementation    #Acute metabolic encephalopathy 2/2 acute bacterial cystitis  -day for ongoing urinary retention  -c/w rocephin for bacteriuria    #Psychosocial Eval  - Pt improved after long discussion with her and family at bedside.  Reports she does not want aggressive care and management but is willing to have surgeries/ medical management that bring her as close to baseline as possible.     DNR/DNI

## 2018-04-27 NOTE — BRIEF OPERATIVE NOTE - PRE-OP DX
Closed displaced intertrochanteric fracture of right femur, initial encounter  04/27/2018    Active  Ishan Mcdonald

## 2018-04-27 NOTE — PROGRESS NOTE ADULT - ASSESSMENT
85 yo F with PMHx of macular degeneration, HTN, mild LV dysfunction, mild CAD (cath in 2013) and hx of syncopal episodes as per family, found down by family members last seen well 2 days prior, unknown down time. Brought in by EMS. She was found shirtless in the basement, completely drenched with water/cleaning solution. Hypothermic to 80 F. Received a total of 5 L (4 L LR, 1 L NS) placed on Norepinephrine gtt for short period of time (BP 60/40s). Improvement of temperature mental status. DNR/DNI. On NRB and warming blankets in ED, found to have new-onset Afib with RVR. Pt. Found to have R intertrochanteric fracture and a L 9th rib fracture on imaging. Patient also found to have CK of 2400 and +UTI with WBC of 15. HCP: Raven Henry#) High AGMA with acute hypercapneic respiratory failure 2/2 hypothermia (Resolved)      #) Acute comminuted R intertrochanteric femoral fracture - npo for OR today - pt and family agreeable for surgery now, pain control, dvt proph, ortho follow up    #) Acute minimally displaced posterior L 9th Rib Fracture - incentive spirometry    #) Mild troponinemia and heart strain 2/2 new-onset paroxsymal AFib (BNP 8476) - converted to NSR - off pressors, amio - not a good candidate for AC  tele monitor until post op 48 hrs  risk strat appreciated by Cardio Dr Britton  may proceed - pt is moderate risk for surgery   echo normal LV function  previous cath - no stents placed    #) Rhabdomyolysis 2/2 mechanical fall - improved ck 800s  cont IVF until OR    #) Multinodular goiter with euthymia  - TFTs wnl    #) Mixed Iron Deficiency Anemia with Anemia of Chronic Disease - sp 1 unit prbc by ortho - H/H stable    #) Mechanical fall/ debility - pt/rehab post op    #) urinary retention - day - tov post op when ambulating     #)hypomag - replete    #) Ecoli UTI - on ROcpehin    discussed with pt, daughter, RN and HS 85 yo F with PMHx of macular degeneration, HTN, mild LV dysfunction, mild CAD (cath in 2013) and hx of syncopal episodes as per family, found down by family members last seen well 2 days prior, unknown down time. Brought in by EMS. She was found shirtless in the basement, completely drenched with water/cleaning solution. Hypothermic to 80 F. Received a total of 5 L (4 L LR, 1 L NS) placed on Norepinephrine gtt for short period of time (BP 60/40s). Improvement of temperature mental status. DNR/DNI. On NRB and warming blankets in ED, found to have new-onset Afib with RVR. Pt. Found to have R intertrochanteric fracture and a L 9th rib fracture on imaging. Patient also found to have CK of 2400 and +UTI with WBC of 15. HCP: Raven Henry#) High AGMA with acute hypercapneic respiratory failure 2/2 hypothermia (Resolved)      #) Acute comminuted R intertrochanteric femoral fracture - npo for OR today - pt and family agreeable for surgery now, pain control, dvt proph, ortho follow up    #) Acute minimally displaced posterior L 9th Rib Fracture - incentive spirometry    #) Mild troponinemia and heart strain 2/2 new-onset paroxsymal AFib (BNP 8476) - converted to NSR - off pressors, amio - not a good candidate for AC  tele monitor until post op 48 hrs  risk strat appreciated by Cardio Dr Britton  may proceed - pt is moderate risk for surgery   echo normal LV function  previous cath - no stents placed    #) Rhabdomyolysis 2/2 mechanical fall - improved ck 800s  cont IVF until OR    #) Multinodular goiter with euthymia  - TFTs wnl    #) Mixed Iron Deficiency Anemia with Anemia of Chronic Disease - sp 1 unit prbc by ortho - H/H stable    #) Mechanical fall/ debility - pt/rehab post op    #) urinary retention - day - tov post op when ambulating     #)hypomag - replete    #) Ecoli UTI  and phelbitis left arm +pus output - cultured, check blood culture - elevate arm - cont unasyn - dc rocephin     discussed with pt, daughter, RN and HS 85 yo F with PMHx of macular degeneration, HTN, mild LV dysfunction, mild CAD (cath in 2013) and hx of syncopal episodes as per family, found down by family members last seen well 2 days prior, unknown down time. Brought in by EMS. She was found shirtless in the basement, completely drenched with water/cleaning solution. Hypothermic to 80 F. Received a total of 5 L (4 L LR, 1 L NS) placed on Norepinephrine gtt for short period of time (BP 60/40s). Improvement of temperature mental status. DNR/DNI. On NRB and warming blankets in ED, found to have new-onset Afib with RVR. Pt. Found to have R intertrochanteric fracture and a L 9th rib fracture on imaging. Patient also found to have CK of 2400 and +UTI with WBC of 15. HCP: Raven Henry#) High AGMA with acute hypercapneic respiratory failure 2/2 hypothermia (Resolved)      #) Acute comminuted R intertrochanteric femoral fracture - npo for OR today - pt and family agreeable for surgery now, pain control, dvt proph, ortho follow up    #) Acute minimally displaced posterior L 9th Rib Fracture - incentive spirometry    #) Mild troponinemia and heart strain 2/2 new-onset paroxsymal AFib (BNP 8476) - converted to NSR - off pressors, amio - not a good candidate for AC  Paroxysmal Afib - in and out of Afib on tele monitor today  follow up with cardio - on digoxin  tele monitor until post op 48 hrs  risk strat appreciated by Cardio Dr Britton  may proceed - pt is moderate risk for surgery   echo normal LV function  previous cath - no stents placed    #) Rhabdomyolysis 2/2 mechanical fall - improved ck 800s  cont IVF until OR    #) Multinodular goiter with euthymia  - TFTs wnl    #) Mixed Iron Deficiency Anemia with Anemia of Chronic Disease - sp 1 unit prbc by ortho - H/H stable    #) Mechanical fall/ debility - pt/rehab post op    #) urinary retention - day - tov post op when ambulating     #)hypomag - replete    #) Ecoli UTI  and phelbitis left arm +pus output - cultured, check blood culture - elevate arm - cont unasyn - dc rocephin     discussed with pt, daughter, RN and HS

## 2018-04-27 NOTE — CHART NOTE - NSCHARTNOTEFT_GEN_A_CORE
Anesthesia Post Op Assessment  		(    ) Intubated           TV _____	Rate __sv___	[id number]____  		(  x  ) Patent airway. Full return of protective reflexes  		(  x  )Full recovery from anesthesia/sedation to baseline status      Cardiovascular Function:  		BP:	170/100	                  Pulse:		104                  RR:		12                  Temp:		98.5                  O2Sat:    99      Mental Status:  	        (   x ) awake		  (    x) alert		 (    ) drowsy	               (    ) sedated      Nausea/Vomiting:  		(    ) Yes, See post-op orders		   (  x  ) No      Pain Scale: (0-10):			Treatment:     (  x  ) None	            (   x ) See Post-Op/PCA Orders      Post-operative Fluids: 	   (    ) Oral	          (   x ) See post-op Orders        Comments:    Uneventful. No complications from anesthesia.  Discharge when criteria met.

## 2018-04-27 NOTE — PROGRESS NOTE ADULT - SUBJECTIVE AND OBJECTIVE BOX
HOSPITALIST ATTENDING NOTE    OZZY BROUSSARD  84y Female  [id number]    INTERVAL HPI/OVERNIGHT EVENTS: no complaints, pain controlled - awaiting surgery    T(C): 36.1 (04-27-18 @ 05:33), Max: 36.4 (04-26-18 @ 20:14)  HR: 87 (04-27-18 @ 07:41) (87 - 111)  BP: 153/82 (04-27-18 @ 07:41) (141/77 - 153/82)  RR: 18 (04-27-18 @ 07:41) (18 - 20)  SpO2: --  Wt(kg): --    04-26-18 @ 07:01  -  04-27-18 @ 07:00  --------------------------------------------------------  IN: 260 mL / OUT: 628 mL / NET: -368 mL        PHYSICAL EXAM:  GENERAL: NAD  HEAD:  Atraumatic, Normocephalic  EYES: EOMI, PERRLA, conjunctiva and sclera clear  NECK: Supple, No JVD, Normal thyroid  NERVOUS SYSTEM:  Alert & Oriented X3, Good concentration; Non-focal- Motor Strength 5/5 B/L upper and lower extremities;  CHEST/LUNG: Clear to ascultation bilaterally; No rales, rhonchi, wheezing,   HEART: Regular rate and rhythm; No murmurs, rubs, or gallops  ABDOMEN: Soft, Nontender, Nondistended; Bowel sounds present  EXTREMITIES: No clubbing, cyanosis, +edema bilaterally , RLE ext rotated   LYMPH: No lymphadenopathy noted  SKIN: No rashes or lesions  PSYCH: No suicidal/homicial ideation/hallucinations    Consultant(s) Notes Reviewed:  [x ] YES  [ ] NO  Care Discussed with Consultants/Other Providers/ Housestaff [ x] YES  [ ] NO    LABS:                        10.1   11.39 )-----------( 193      ( 27 Apr 2018 07:08 )             30.8     04-27    143  |  107  |  21<H>  ----------------------------<  102<H>  4.3   |  24  |  0.5<L>    Ca    7.6<L>      27 Apr 2018 07:08  Mg     2.1     04-27        Culture - Blood (collected 24 Apr 2018 21:22)  Source: .Blood None  Preliminary Report (26 Apr 2018 07:01):    No growth to date.    Culture - Urine (collected 24 Apr 2018 17:50)  Source: .Urine Catheterized  Final Report (25 Apr 2018 23:43):    No growth          RADIOLOGY & ADDITIONAL TESTS:    Imaging or report Personally Reviewed:  [ ] YES  [ ] NO    Case discussed with resident    Care discussed with pt/family      HEALTH ISSUES - PROBLEM Dx:  Delirium due to another medical condition

## 2018-04-28 LAB
ANION GAP SERPL CALC-SCNC: 12 MMOL/L — SIGNIFICANT CHANGE UP (ref 7–14)
BASOPHILS # BLD AUTO: 0.06 K/UL — SIGNIFICANT CHANGE UP (ref 0–0.2)
BASOPHILS NFR BLD AUTO: 0.5 % — SIGNIFICANT CHANGE UP (ref 0–1)
BUN SERPL-MCNC: 19 MG/DL — SIGNIFICANT CHANGE UP (ref 10–20)
CALCIUM SERPL-MCNC: 7.2 MG/DL — LOW (ref 8.5–10.1)
CHLORIDE SERPL-SCNC: 105 MMOL/L — SIGNIFICANT CHANGE UP (ref 98–110)
CO2 SERPL-SCNC: 25 MMOL/L — SIGNIFICANT CHANGE UP (ref 17–32)
CREAT SERPL-MCNC: 0.5 MG/DL — LOW (ref 0.7–1.5)
EOSINOPHIL # BLD AUTO: 0 K/UL — SIGNIFICANT CHANGE UP (ref 0–0.7)
EOSINOPHIL NFR BLD AUTO: 0 % — SIGNIFICANT CHANGE UP (ref 0–8)
GLUCOSE SERPL-MCNC: 102 MG/DL — HIGH (ref 70–99)
HCT VFR BLD CALC: 32.1 % — LOW (ref 37–47)
HGB BLD-MCNC: 10.5 G/DL — LOW (ref 12–16)
IMM GRANULOCYTES NFR BLD AUTO: 4.1 % — HIGH (ref 0.1–0.3)
LYMPHOCYTES # BLD AUTO: 0.84 K/UL — LOW (ref 1.2–3.4)
LYMPHOCYTES # BLD AUTO: 7.6 % — LOW (ref 20.5–51.1)
MAGNESIUM SERPL-MCNC: 1.9 MG/DL — SIGNIFICANT CHANGE UP (ref 1.8–2.4)
MCHC RBC-ENTMCNC: 30.8 PG — SIGNIFICANT CHANGE UP (ref 27–31)
MCHC RBC-ENTMCNC: 32.7 G/DL — SIGNIFICANT CHANGE UP (ref 32–37)
MCV RBC AUTO: 94.1 FL — SIGNIFICANT CHANGE UP (ref 81–99)
MONOCYTES # BLD AUTO: 1.24 K/UL — HIGH (ref 0.1–0.6)
MONOCYTES NFR BLD AUTO: 11.2 % — HIGH (ref 1.7–9.3)
NEUTROPHILS # BLD AUTO: 8.49 K/UL — HIGH (ref 1.4–6.5)
NEUTROPHILS NFR BLD AUTO: 76.6 % — HIGH (ref 42.2–75.2)
NRBC # BLD: 0 /100 WBCS — SIGNIFICANT CHANGE UP (ref 0–0)
PLATELET # BLD AUTO: 197 K/UL — SIGNIFICANT CHANGE UP (ref 130–400)
POTASSIUM SERPL-MCNC: 4.8 MMOL/L — SIGNIFICANT CHANGE UP (ref 3.5–5)
POTASSIUM SERPL-SCNC: 4.8 MMOL/L — SIGNIFICANT CHANGE UP (ref 3.5–5)
RBC # BLD: 3.41 M/UL — LOW (ref 4.2–5.4)
RBC # FLD: 15.5 % — HIGH (ref 11.5–14.5)
SODIUM SERPL-SCNC: 142 MMOL/L — SIGNIFICANT CHANGE UP (ref 135–146)
WBC # BLD: 11.09 K/UL — HIGH (ref 4.8–10.8)
WBC # FLD AUTO: 11.09 K/UL — HIGH (ref 4.8–10.8)

## 2018-04-28 RX ADMIN — SENNA PLUS 2 TABLET(S): 8.6 TABLET ORAL at 22:07

## 2018-04-28 RX ADMIN — Medication 0.12 MILLIGRAM(S): at 06:59

## 2018-04-28 RX ADMIN — CEFTRIAXONE 100 GRAM(S): 500 INJECTION, POWDER, FOR SOLUTION INTRAMUSCULAR; INTRAVENOUS at 22:11

## 2018-04-28 RX ADMIN — Medication 325 MILLIGRAM(S): at 13:48

## 2018-04-28 RX ADMIN — Medication 100 MILLIGRAM(S): at 06:56

## 2018-04-28 RX ADMIN — Medication 12.5 MILLIGRAM(S): at 18:23

## 2018-04-28 RX ADMIN — Medication 12.5 MILLIGRAM(S): at 06:59

## 2018-04-28 RX ADMIN — MORPHINE SULFATE 2 MILLIGRAM(S): 50 CAPSULE, EXTENDED RELEASE ORAL at 20:13

## 2018-04-28 RX ADMIN — Medication 100 MILLIGRAM(S): at 06:59

## 2018-04-28 RX ADMIN — AMPICILLIN SODIUM AND SULBACTAM SODIUM 200 GRAM(S): 250; 125 INJECTION, POWDER, FOR SUSPENSION INTRAMUSCULAR; INTRAVENOUS at 06:57

## 2018-04-28 RX ADMIN — MORPHINE SULFATE 2 MILLIGRAM(S): 50 CAPSULE, EXTENDED RELEASE ORAL at 20:30

## 2018-04-28 RX ADMIN — ENOXAPARIN SODIUM 40 MILLIGRAM(S): 100 INJECTION SUBCUTANEOUS at 13:48

## 2018-04-28 RX ADMIN — Medication 100 MILLIGRAM(S): at 18:23

## 2018-04-28 RX ADMIN — PANTOPRAZOLE SODIUM 40 MILLIGRAM(S): 20 TABLET, DELAYED RELEASE ORAL at 06:59

## 2018-04-28 NOTE — PROGRESS NOTE ADULT - SUBJECTIVE AND OBJECTIVE BOX
OZZY BROUSSARD  84y Female    INTERVAL HPI/OVERNIGHT EVENTS:  Patient seen and examined. No SOB. No CP. No Palpitations.    ROS: All other systems are negative.    Vital Signs:    T(F): 97.2 (04-28-18 @ 05:27), Max: 98.5 (04-27-18 @ 17:10)  HR: 111 (04-28-18 @ 06:59) (82 - 116)  BP: 104/60 (04-28-18 @ 06:59) (92/53 - 193/85)  RR: 18 (04-28-18 @ 05:27) (13 - 18)  SpO2: 99% (04-27-18 @ 18:00) (96% - 100%)  I&O's Summary    27 Apr 2018 07:01  -  28 Apr 2018 07:00  --------------------------------------------------------  IN: 100 mL / OUT: 175 mL / NET: -75 mL      Daily Height in cm: 165.1 (27 Apr 2018 13:44)    Daily   CAPILLARY BLOOD GLUCOSE  109 (27 Apr 2018 20:40)          PHYSICAL EXAM:    GENERAL: Alert & oriented X 3. NAD  SKIN: No rashes or lesions  HENT: Atrumatic. Normocephalic. PERRL. Moist membranes.  NECK: Supple, No JVD. No lymphadenopathy.  PULMONARY: CTA B/L ant. No wheezing. No rales  CVS: Normal S1, S2. Rate and Rythm are irregularly irregular. No murmurs, rubs or gallops.  ABDOMEN: Soft, Nontender, Nondistended; Bowel sounds present  EXTREMITIES: Peripheral pulses intact. 2+ pitting edema B/L.  NEUROLOGIC:  No motor or sensory deficit.    Consultant(s) Notes Reviewed:  [x ] YES  [ ] NO  Care Discussed with Consultants/Other Providers [ x] YES  [ ] NO    EKG reviewed  Telemetry reviewed    LABS:                        10.5   11.09 )-----------( 197      ( 28 Apr 2018 06:21 )             32.1     04-28    142  |  105  |  19  ----------------------------<  102<H>  4.8   |  25  |  0.5<L>    Ca    7.2<L>      28 Apr 2018 06:21  Mg     1.9     04-28      PT/INR - ( 27 Apr 2018 07:08 )   PT: 12.50 sec;   INR: 1.15 ratio         PTT - ( 27 Apr 2018 07:08 )  PTT:28.1 sec  Trop --, CKMB --, , 04-25-18 @ 16:33      Culture - Blood (collected 26 Apr 2018 23:29)  Source: .Blood None  Preliminary Report (28 Apr 2018 12:01):    No growth to date.    Culture - Blood (collected 26 Apr 2018 16:00)  Source: .Blood Blood  Preliminary Report (27 Apr 2018 23:01):    No growth to date.    Culture - Other (collected 26 Apr 2018 14:59)  Source: .Other Wound culture  Preliminary Report (27 Apr 2018 23:21):    No growth to date.        RADIOLOGY & ADDITIONAL TESTS:      Imaging or report Personally Reviewed:  [ ] YES  [ ] NO    Medications:  Standing  ampicillin/sulbactam  IVPB 3 Gram(s) IV Intermittent every 6 hours  cefTRIAXone   IVPB 1 Gram(s) IV Intermittent every 24 hours  digoxin     Tablet 0.125 milliGRAM(s) Oral daily  docusate sodium 100 milliGRAM(s) Oral two times a day  enoxaparin Injectable 40 milliGRAM(s) SubCutaneous daily  ferrous    sulfate 325 milliGRAM(s) Oral daily  metoprolol tartrate 12.5 milliGRAM(s) Oral two times a day  pantoprazole    Tablet 40 milliGRAM(s) Oral before breakfast  senna 2 Tablet(s) Oral at bedtime    PRN Meds  morphine  - Injectable 2 milliGRAM(s) IV Push every 4 hours PRN  oxyCODONE    IR 5 milliGRAM(s) Oral every 6 hours PRN      Case discussed with resident    Care discussed with pt/family

## 2018-04-28 NOTE — PROGRESS NOTE ADULT - SUBJECTIVE AND OBJECTIVE BOX
SUBJECTIVE:    Patient is a 84y old Female who presents with a chief complaint of   Currently admitted to medicine with the primary diagnosis of Hypothermia, initial encounter     Today is hospital day 5d.   OVERNIGHT EVENTS  Today, patient is    PAST MEDICAL & SURGICAL HISTORY  HTN (hypertension)          ALLERGIES:  No Known Allergies    MEDICATIONS:  STANDING MEDICATIONS  cefTRIAXone   IVPB 1 Gram(s) IV Intermittent every 24 hours  digoxin     Tablet 0.125 milliGRAM(s) Oral daily  docusate sodium 100 milliGRAM(s) Oral two times a day  enoxaparin Injectable 40 milliGRAM(s) SubCutaneous daily  ferrous    sulfate 325 milliGRAM(s) Oral daily  metoprolol tartrate 12.5 milliGRAM(s) Oral two times a day  pantoprazole    Tablet 40 milliGRAM(s) Oral before breakfast  senna 2 Tablet(s) Oral at bedtime    PRN MEDICATIONS  morphine  - Injectable 2 milliGRAM(s) IV Push every 4 hours PRN  oxyCODONE    IR 5 milliGRAM(s) Oral every 6 hours PRN    VITALS:   T(F): 97.2  HR: 111  BP: 105/57  RR: 18  SpO2: 99%          LABS:                        10.5   11.09 )-----------( 197      ( 28 Apr 2018 06:21 )             32.1     04-28    142  |  105  |  19  ----------------------------<  102<H>  4.8   |  25  |  0.5<L>    Ca    7.2<L>      28 Apr 2018 06:21  Mg     1.9     04-28      PT/INR - ( 27 Apr 2018 07:08 )   PT: 12.50 sec;   INR: 1.15 ratio         PTT - ( 27 Apr 2018 07:08 )  PTT:28.1 sec          Culture - Blood (collected 26 Apr 2018 23:29)  Source: .Blood None  Preliminary Report (28 Apr 2018 12:01):    No growth to date.    Culture - Blood (collected 26 Apr 2018 16:00)  Source: .Blood Blood  Preliminary Report (27 Apr 2018 23:01):    No growth to date.    Culture - Other (collected 26 Apr 2018 14:59)  Source: .Other Wound culture  Preliminary Report (27 Apr 2018 23:21):    No growth to date.          RADIOLOGY:    PHYSICAL EXAM:  GEN:   LUNGS:   HEART:   ABD:   EXT:   NEURO: SUBJECTIVE:    Patient is a 84y old Female who presents with a chief complaint of   Currently admitted to medicine with the primary diagnosis of Hypothermia, initial encounter     Today is hospital day 5d.   OVERNIGHT EVENTS: none  Today, patient is doing well.  no complaints.      PAST MEDICAL & SURGICAL HISTORY  HTN (hypertension)      ALLERGIES:  No Known Allergies    MEDICATIONS:  STANDING MEDICATIONS  cefTRIAXone   IVPB 1 Gram(s) IV Intermittent every 24 hours  digoxin     Tablet 0.125 milliGRAM(s) Oral daily  docusate sodium 100 milliGRAM(s) Oral two times a day  enoxaparin Injectable 40 milliGRAM(s) SubCutaneous daily  ferrous    sulfate 325 milliGRAM(s) Oral daily  metoprolol tartrate 12.5 milliGRAM(s) Oral two times a day  pantoprazole    Tablet 40 milliGRAM(s) Oral before breakfast  senna 2 Tablet(s) Oral at bedtime    PRN MEDICATIONS  morphine  - Injectable 2 milliGRAM(s) IV Push every 4 hours PRN  oxyCODONE    IR 5 milliGRAM(s) Oral every 6 hours PRN    VITALS:   T(F): 97.2  HR: 111  BP: 105/57  RR: 18  SpO2: 99%          LABS:                        10.5   11.09 )-----------( 197      ( 28 Apr 2018 06:21 )             32.1     04-28    142  |  105  |  19  ----------------------------<  102<H>  4.8   |  25  |  0.5<L>    Ca    7.2<L>      28 Apr 2018 06:21  Mg     1.9     04-28      PT/INR - ( 27 Apr 2018 07:08 )   PT: 12.50 sec;   INR: 1.15 ratio         PTT - ( 27 Apr 2018 07:08 )  PTT:28.1 sec          Culture - Blood (collected 26 Apr 2018 23:29)  Source: .Blood None  Preliminary Report (28 Apr 2018 12:01):    No growth to date.    Culture - Blood (collected 26 Apr 2018 16:00)  Source: .Blood Blood  Preliminary Report (27 Apr 2018 23:01):    No growth to date.    Culture - Other (collected 26 Apr 2018 14:59)  Source: .Other Wound culture  Preliminary Report (27 Apr 2018 23:21):    No growth to date.          RADIOLOGY:    PHYSICAL EXAM:  GEN: awake, alert  LUNGS: clear to asucultation  HEART: RRR  ABD: soft non tender, non distended  EXT: no edema  NEURO: oriented x 3

## 2018-04-28 NOTE — ANESTHESIA FOLLOW-UP NOTE - NSEVALATIONFT_GEN_ALL_CORE
patient visited in the floor no complaints doing well and no post anesthetic complications tip of right index finger blue which is same as preop no worse

## 2018-04-28 NOTE — PROGRESS NOTE ADULT - ASSESSMENT
83 yo F with PMHx of macular degeneration, HTN, mild LV dysfunction, mild CAD (cath in 2013) and hx of syncopal episodes as per family, found down by family members last seen well 2 days prior, unknown down time    1.	High AGMA with acute hypercapneic respiratory failure 2/2 hypothermia (Resolved)  2.	Acute comminuted R intertrochanteric femoral fracture S/P IMN  3.   Acute minimally displaced posterior L 9th Rib Fracture   4.   Mild troponinemia and heart strain 2/2 new-onset paroxsymal AFib (BNP 8476)  5.   Rhabdomyolysis 2/2 mechanical fall (CK 2748)- resolved  6.   Multinodular goiter   7.   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   Mechanical fall   9.   Acute metabolic encephalopathy 2/2 acute bacterial cystitis - resolved  10.  AF with RVR        PLAN:    ·	Cont tele  ·	WBAT  ·	Ortho F/U noted  ·	Rehab eval. OOB to Chair  ·	Increase metoprolol to 25 mg po q 12h  ·	Pain control  ·	D/C Unasyn. Cont Ceftriaxone 83 yo F with PMHx of macular degeneration, HTN, mild LV dysfunction, mild CAD (cath in 2013) and hx of syncopal episodes as per family, found down by family members last seen well 2 days prior, unknown down time    1.	High AGMA with acute hypercapneic respiratory failure 2/2 hypothermia (Resolved)  2.	Acute comminuted R intertrochanteric femoral fracture S/P IMN  3.   Acute minimally displaced posterior L 9th Rib Fracture   4.   Mild troponinemia and heart strain 2/2 new-onset paroxsymal AFib (BNP 8476)  5.   Rhabdomyolysis 2/2 mechanical fall (CK 2748)- resolved  6.   Multinodular goiter   7.   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   Mechanical fall   9.   Acute metabolic encephalopathy 2/2 acute bacterial cystitis - resolved  10.  AF with RVR  12. Shock unspecified on admission        PLAN:    ·	Cont tele  ·	WBAT  ·	Ortho F/U noted  ·	Rehab eval. OOB to Chair  ·	Increase metoprolol to 25 mg po q 12h  ·	Pain control  ·	D/C Unasyn. Cont Ceftriaxone

## 2018-04-28 NOTE — CHART NOTE - NSCHARTNOTEFT_GEN_A_CORE
Registered Dietitian Follow-Up     Patient Profile Reviewed                           Yes [x]   No []     Nutrition History Previously Obtained        Yes [x]  No []       Pertinent Subjective Information: Pt reports good paul/PO intake. Reports she is eating food brought in by daughter.      Pertinent Medical Interventions: s/p right hip intrademullary nailing     Diet order: Low sodium     Anthropometrics:  - Ht.  - Wt. 64.8 kg, wt trending up; 1+ generalized edema noted  - %wt change  - BMI  - IBW     Pertinent Lab Data: /28: Na 142, K 4.8, BUN 19, Cr 0.5, Glucose 102     Pertinent Meds: enoxaparin, digoxin, docusate, ferrous sulfate, metoprolol, morphine, senna     Physical Findings:  - Appearance: alert/oriented; 1+ generalized edema  - GI function: no gi issues noted  - Tubes:  - Oral/Mouth cavity: no problems noted  - Skin: stage I to sacral spine     Nutrition Requirements  Weight Used: 59.8 kg (wt from initial assessment)     Estimated needs: continue  Calories: 6160-9777 kcal/day (MSJ x 1.2-1.3)  Protein: 60-72g/day  Fluid: per ICU team    Nutrient Intake: Meeting needs        [] Previous Nutrition Diagnosis: Inadequate oral intake (resolved)            [] Ongoing          [] Resolved    [] No active nutrition diagnosis identified at this time     Nutrition Intervention: Meals and snacks     Goal/Expected Outcome: PO >50-75% of meals over next 7 days      Indicator/Monitoring: RD to monitor energy intake, nutrition focused phys findings (skin)

## 2018-04-28 NOTE — PROGRESS NOTE ADULT - SUBJECTIVE AND OBJECTIVE BOX
POD #1  alert in good spirits   less pain  will start PT  discussed with patient and daughter   want DC plan for home if possible

## 2018-04-28 NOTE — PROGRESS NOTE ADULT - SUBJECTIVE AND OBJECTIVE BOX
pt se post op pain controlled denies fc nv dc cp sob calf pain    aox3 nad  r hip   cdi  silt  nvid  digits well perfused    sp R hip imn  - wbat  - dvt ppx  - pain control  - pt  - dc planning

## 2018-04-28 NOTE — PROGRESS NOTE ADULT - ASSESSMENT
84 y.o female with h/o HTN, syncopal episodes presents s/p apparent fall /w AMS, acute resp failure, hypothermia, skeletal fractures. Stepped down from ICU for ongoing management of R trochanteric fracture, rhabdomyolysis, new-onset A-fib /w RVR.    # Anion Gap Metabolic Acidosis /w acute hypercapneic respiratory failure + hypothermia  -resolved    # Acute Commuted R intertrochanteric femoral fracture  - s/p transfusion 1 unit pRBCs, HgB = 10.1  - s/p ORIF.    - PT rehab.  d/c planning    # Acute minimally displaced L 9th rib fracture  - no intervention at this time    #Paroxysmal Atrial Fibrillation  -changed digoxin from IV to pO  -no A/C due to fall risk    #Thrombophlebitis  -per RN, both IV lines infiltrated with purulent discharge from the site of IV.    -c/w Unasyn  -f/u wound cultures and blood cultures - negative so far.  Change Unasyn to augmentin and d/c on augmentin for 7 days.     #Decreased urine output: resolved. Due to decreased hypovolemia  -NS @ 75 cc/hr    #Rhabdomyolysis 2/2 mechanical fall: resolved  - CPK downtrending (868 from ~2700)    #Multinodular goiter (euthyroid)  -f/u as outpatient    #Mixed anemia: THIERRY + Anemia of chronic disease  -started on iron supplementation    #Acute metabolic encephalopathy 2/2 acute bacterial cystitis  -day for ongoing urinary retention  -c/w rocephin for bacteriuria    DNR/DNI  PT/rehab. D.c planning

## 2018-04-29 LAB
ANION GAP SERPL CALC-SCNC: 9 MMOL/L — SIGNIFICANT CHANGE UP (ref 7–14)
BASOPHILS # BLD AUTO: 0.03 K/UL — SIGNIFICANT CHANGE UP (ref 0–0.2)
BASOPHILS NFR BLD AUTO: 0.3 % — SIGNIFICANT CHANGE UP (ref 0–1)
BUN SERPL-MCNC: 21 MG/DL — HIGH (ref 10–20)
CALCIUM SERPL-MCNC: 7.5 MG/DL — LOW (ref 8.5–10.1)
CHLORIDE SERPL-SCNC: 104 MMOL/L — SIGNIFICANT CHANGE UP (ref 98–110)
CO2 SERPL-SCNC: 29 MMOL/L — SIGNIFICANT CHANGE UP (ref 17–32)
CREAT SERPL-MCNC: 0.5 MG/DL — LOW (ref 0.7–1.5)
CULTURE RESULTS: SIGNIFICANT CHANGE UP
EOSINOPHIL # BLD AUTO: 0.25 K/UL — SIGNIFICANT CHANGE UP (ref 0–0.7)
EOSINOPHIL NFR BLD AUTO: 2.1 % — SIGNIFICANT CHANGE UP (ref 0–8)
GLUCOSE SERPL-MCNC: 120 MG/DL — HIGH (ref 70–99)
HCT VFR BLD CALC: 31.3 % — LOW (ref 37–47)
HGB BLD-MCNC: 10 G/DL — LOW (ref 12–16)
IMM GRANULOCYTES NFR BLD AUTO: 3.7 % — HIGH (ref 0.1–0.3)
LYMPHOCYTES # BLD AUTO: 1.45 K/UL — SIGNIFICANT CHANGE UP (ref 1.2–3.4)
LYMPHOCYTES # BLD AUTO: 12.1 % — LOW (ref 20.5–51.1)
MCHC RBC-ENTMCNC: 30.7 PG — SIGNIFICANT CHANGE UP (ref 27–31)
MCHC RBC-ENTMCNC: 31.9 G/DL — LOW (ref 32–37)
MCV RBC AUTO: 96 FL — SIGNIFICANT CHANGE UP (ref 81–99)
MONOCYTES # BLD AUTO: 1.28 K/UL — HIGH (ref 0.1–0.6)
MONOCYTES NFR BLD AUTO: 10.7 % — HIGH (ref 1.7–9.3)
NEUTROPHILS # BLD AUTO: 8.49 K/UL — HIGH (ref 1.4–6.5)
NEUTROPHILS NFR BLD AUTO: 71.1 % — SIGNIFICANT CHANGE UP (ref 42.2–75.2)
NRBC # BLD: 0 /100 WBCS — SIGNIFICANT CHANGE UP (ref 0–0)
PLATELET # BLD AUTO: 181 K/UL — SIGNIFICANT CHANGE UP (ref 130–400)
POTASSIUM SERPL-MCNC: 4.5 MMOL/L — SIGNIFICANT CHANGE UP (ref 3.5–5)
POTASSIUM SERPL-SCNC: 4.5 MMOL/L — SIGNIFICANT CHANGE UP (ref 3.5–5)
RBC # BLD: 3.26 M/UL — LOW (ref 4.2–5.4)
RBC # FLD: 15.4 % — HIGH (ref 11.5–14.5)
SODIUM SERPL-SCNC: 142 MMOL/L — SIGNIFICANT CHANGE UP (ref 135–146)
SPECIMEN SOURCE: SIGNIFICANT CHANGE UP
WBC # BLD: 11.94 K/UL — HIGH (ref 4.8–10.8)
WBC # FLD AUTO: 11.94 K/UL — HIGH (ref 4.8–10.8)

## 2018-04-29 RX ADMIN — Medication 100 MILLIGRAM(S): at 17:42

## 2018-04-29 RX ADMIN — Medication 100 MILLIGRAM(S): at 06:57

## 2018-04-29 RX ADMIN — Medication 0.12 MILLIGRAM(S): at 06:57

## 2018-04-29 RX ADMIN — Medication 12.5 MILLIGRAM(S): at 06:57

## 2018-04-29 RX ADMIN — SENNA PLUS 2 TABLET(S): 8.6 TABLET ORAL at 21:26

## 2018-04-29 RX ADMIN — Medication 325 MILLIGRAM(S): at 13:09

## 2018-04-29 RX ADMIN — ENOXAPARIN SODIUM 40 MILLIGRAM(S): 100 INJECTION SUBCUTANEOUS at 13:09

## 2018-04-29 RX ADMIN — PANTOPRAZOLE SODIUM 40 MILLIGRAM(S): 20 TABLET, DELAYED RELEASE ORAL at 06:57

## 2018-04-29 RX ADMIN — Medication 12.5 MILLIGRAM(S): at 17:42

## 2018-04-29 RX ADMIN — MORPHINE SULFATE 2 MILLIGRAM(S): 50 CAPSULE, EXTENDED RELEASE ORAL at 17:38

## 2018-04-29 RX ADMIN — MORPHINE SULFATE 2 MILLIGRAM(S): 50 CAPSULE, EXTENDED RELEASE ORAL at 10:59

## 2018-04-29 NOTE — PROGRESS NOTE ADULT - SUBJECTIVE AND OBJECTIVE BOX
pt se this am pain controlled denies fc nv dc cp sob calf pain    aox3 nad  r hip  cdi  silt  nvid  digits well perfused    r hip imn  - wbat  - pt  - dvt ppx  - pain control   - dc planning pt se this am pain controlled denies fc nv dc cp sob calf pain    aox3 nad  r hip  cdi  silt  nvid  digits well perfused    r hip imn  - wbat  - pt  - dvt ppx  - pain control   - dc planning  oob to chair   in good spirits  eating lunch   less pain   s/w hospitalist  stable   to start rehab

## 2018-04-29 NOTE — PROGRESS NOTE ADULT - SUBJECTIVE AND OBJECTIVE BOX
OZZY BROUSSARD  84y Female    INTERVAL HPI/OVERNIGHT EVENTS:  Patient seen and examined. No SOB. No palpitations. No pain  ROS: All other systems are negative.    Vital Signs:    T(F): 97.9 (04-29-18 @ 06:07), Max: 99.3 (04-28-18 @ 20:27)  HR: 87 (04-28-18 @ 20:27) (87 - 111)  BP: 122/60 (04-29-18 @ 06:07) (105/57 - 143/82)  RR: 18 (04-28-18 @ 20:27) (18 - 18)  SpO2: --  I&O's Summary    28 Apr 2018 07:01  -  29 Apr 2018 07:00  --------------------------------------------------------  IN: 600 mL / OUT: 1201 mL / NET: -601 mL      Daily     Daily   CAPILLARY BLOOD GLUCOSE          PHYSICAL EXAM:    GENERAL: Alert & oriented X 3. NAD  SKIN: No rashes or lesions  HENT: Atrumatic. Normocephalic. PERRL. Moist membranes.  NECK: Supple, No JVD. No lymphadenopathy.  PULMONARY: CTA B/L. No wheezing. No rales  CVS: Normal S1, S2. Rate and Rythm are IRIR. No murmurs, rubs or gallops.  ABDOMEN: Soft, Nontender, Nondistended; Bowel sounds present  EXTREMITIES: Trace pitting edema B/L LE.  NEUROLOGIC:  No motor or sensory deficit.    Consultant(s) Notes Reviewed:  [x ] YES  [ ] NO      EKG reviewed  Telemetry reviewed    LABS:                        10.5   11.09 )-----------( 197      ( 28 Apr 2018 06:21 )             32.1     04-28    142  |  105  |  19  ----------------------------<  102<H>  4.8   |  25  |  0.5<L>    Ca    7.2<L>      28 Apr 2018 06:21  Mg     1.9     04-28            Culture - Blood (collected 26 Apr 2018 23:29)  Source: .Blood None  Preliminary Report (28 Apr 2018 12:01):    No growth to date.    Culture - Blood (collected 26 Apr 2018 16:00)  Source: .Blood Blood  Preliminary Report (27 Apr 2018 23:01):    No growth to date.    Culture - Other (collected 26 Apr 2018 14:59)  Source: .Other Wound culture  Preliminary Report (28 Apr 2018 19:07):    No growth at 48 hours        RADIOLOGY & ADDITIONAL TESTS:      Imaging or report Personally Reviewed:  [ ] YES  [ ] NO    Medications:  Standing  cefTRIAXone   IVPB 1 Gram(s) IV Intermittent every 24 hours  digoxin     Tablet 0.125 milliGRAM(s) Oral daily  docusate sodium 100 milliGRAM(s) Oral two times a day  enoxaparin Injectable 40 milliGRAM(s) SubCutaneous daily  ferrous    sulfate 325 milliGRAM(s) Oral daily  metoprolol tartrate 12.5 milliGRAM(s) Oral two times a day  pantoprazole    Tablet 40 milliGRAM(s) Oral before breakfast  senna 2 Tablet(s) Oral at bedtime    PRN Meds  morphine  - Injectable 2 milliGRAM(s) IV Push every 4 hours PRN  oxyCODONE    IR 5 milliGRAM(s) Oral every 6 hours PRN      Case discussed with resident    Care discussed with pt/family

## 2018-04-29 NOTE — CONSULT NOTE ADULT - ASSESSMENT
PHYSICAL EXAM:  alert in bed oriented x3 son in law at bedside    Constitutional:    Eyes: EOMI,     ENMT: O2 via NC    Neck :no JVD     Cardiovascular: s1s2    Gastrointestinal: soft nontender    Genitourinary: day DCed today  Extremities: right hip -dressing in place , swelling in all extremities.    Neurological: AAOx3    Skin: slight ecchymosis in upper ext.'s    Musculoskeletal: motor elevates BUE and LLE antigravity. RT foot mobile    social hX : lives alone. 5 NINI, children involved. used a cane PTA. PHYSICAL EXAM:  alert in bed oriented x3 son in law at bedside    Constitutional:    Eyes: EOMI,     ENMT: O2 via NC    Neck :no JVD     Cardiovascular: s1s2    Gastrointestinal: soft nontender    Genitourinary: day DCed today  Extremities: right hip -dressing in place , swelling in all extremities.    Neurological: AAOx3    Skin: slight ecchymosis in upper ext.'s    Musculoskeletal: motor elevates BUE and LLE antigravity. RT foot mobile    social hX : lives alone. 5 NINI, children involved. used a cane PTA.      IMP- Rehab of R hip Fx ORIF    PLAN Bedside PT, OT     Good 4A candidate Once Medically Stable    WBAT RLE

## 2018-04-29 NOTE — CONSULT NOTE ADULT - SUBJECTIVE AND OBJECTIVE BOX
Patient is a 84y old Female who presents with a chief complaint of   Currently admitted to medicine with the primary diagnosis of Hypothermia, initial encounterPAST MEDICAL & SURGICAL HISTORY  HTN (hypertension)MEDICATIONS:  STANDING MEDICATIONS  cefTRIAXone   IVPB 1 Gram(s) IV Intermittent every 24 hours  digoxin     Tablet 0.125 milliGRAM(s) Oral daily  docusate sodium 100 milliGRAM(s) Oral two times a day  enoxaparin Injectable 40 milliGRAM(s) SubCutaneous daily  ferrous    sulfate 325 milliGRAM(s) Oral daily  metoprolol tartrate 12.5 milliGRAM(s) Oral two times a day  pantoprazole    Tablet 40 milliGRAM(s) Oral before breakfast  senna 2 Tablet(s) Oral at bedtimeMEDICATIONS:  STANDING MEDICATIONS  cefTRIAXone   IVPB 1 Gram(s) IV Intermittent every 24 hours  digoxin     Tablet 0.125 milliGRAM(s) Oral daily  docusate sodium 100 milliGRAM(s) Oral two times a day  enoxaparin Injectable 40 milliGRAM(s) SubCutaneous daily  ferrous    sulfate 325 milliGRAM(s) Oral daily  metoprolol tartrate 12.5 milliGRAM(s) Oral two times a day  pantoprazole    Tablet 40 milliGRAM(s) Oral before breakfast  senna 2 Tablet(s) Oral at [id number] y.o female with h/o HTN, syncopal episodes presents s/p apparent fall /w AMS, acute resp failure, hypothermia, skeletal fractures. Stepped down from ICU for ongoing management of R trochanteric fracture, rhabdomyolysis, new-onset A-fib /w RVR.84 y.o female with h/o HTN, syncopal episodes presents s/p apparent fall /w AMS, acute resp failure, hypothermia, skeletal fractures. Stepped down from ICU for ongoing management of R trochanteric fracture, rhabdomyolysis, new-onset A-fib /w RVR.84 y.o female with h/o HTN, syncopal episodes presents s/p apparent fall /w AMS, acute resp failure, hypothermia, skeletal fractures. Stepped down from ICU for ongoing management of R trochanteric fracture, rhabdomyolysis, new-onset A-fib /w RVR.84 y.o female with h/o HTN, syncopal episodes presents s/p apparent fall /w AMS, acute resp failure, hypothermia, skeletal fractures. Stepped down from ICU for ongoing management of R trochanteric fracture, rhabdomyolysis, new-onset A-fib /w RVR.84 y.o female with h/o HTN, syncopal episodes presents s/p apparent fall /w AMS, acute resp failure, hypothermia, skeletal fractures. Stepped down from ICU for ongoing management of R trochanteric fracture, rhabdomyolysis, new-onset A-fib /w RVR.

## 2018-04-30 LAB
ANION GAP SERPL CALC-SCNC: 11 MMOL/L — SIGNIFICANT CHANGE UP (ref 7–14)
BASOPHILS # BLD AUTO: 0.02 K/UL — SIGNIFICANT CHANGE UP (ref 0–0.2)
BASOPHILS NFR BLD AUTO: 0.2 % — SIGNIFICANT CHANGE UP (ref 0–1)
BUN SERPL-MCNC: 19 MG/DL — SIGNIFICANT CHANGE UP (ref 10–20)
CALCIUM SERPL-MCNC: 7.6 MG/DL — LOW (ref 8.5–10.1)
CHLORIDE SERPL-SCNC: 101 MMOL/L — SIGNIFICANT CHANGE UP (ref 98–110)
CO2 SERPL-SCNC: 27 MMOL/L — SIGNIFICANT CHANGE UP (ref 17–32)
CREAT SERPL-MCNC: <0.5 MG/DL — LOW (ref 0.7–1.5)
CULTURE RESULTS: SIGNIFICANT CHANGE UP
EOSINOPHIL # BLD AUTO: 0.13 K/UL — SIGNIFICANT CHANGE UP (ref 0–0.7)
EOSINOPHIL NFR BLD AUTO: 1.1 % — SIGNIFICANT CHANGE UP (ref 0–8)
GLUCOSE SERPL-MCNC: 96 MG/DL — SIGNIFICANT CHANGE UP (ref 70–99)
HCT VFR BLD CALC: 29.9 % — LOW (ref 37–47)
HGB BLD-MCNC: 9.6 G/DL — LOW (ref 12–16)
IMM GRANULOCYTES NFR BLD AUTO: 2.4 % — HIGH (ref 0.1–0.3)
LYMPHOCYTES # BLD AUTO: 1.11 K/UL — LOW (ref 1.2–3.4)
LYMPHOCYTES # BLD AUTO: 9.7 % — LOW (ref 20.5–51.1)
MCHC RBC-ENTMCNC: 30.7 PG — SIGNIFICANT CHANGE UP (ref 27–31)
MCHC RBC-ENTMCNC: 32.1 G/DL — SIGNIFICANT CHANGE UP (ref 32–37)
MCV RBC AUTO: 95.5 FL — SIGNIFICANT CHANGE UP (ref 81–99)
MONOCYTES # BLD AUTO: 1.19 K/UL — HIGH (ref 0.1–0.6)
MONOCYTES NFR BLD AUTO: 10.4 % — HIGH (ref 1.7–9.3)
NEUTROPHILS # BLD AUTO: 8.67 K/UL — HIGH (ref 1.4–6.5)
NEUTROPHILS NFR BLD AUTO: 76.2 % — HIGH (ref 42.2–75.2)
NRBC # BLD: 0 /100 WBCS — SIGNIFICANT CHANGE UP (ref 0–0)
PLATELET # BLD AUTO: 186 K/UL — SIGNIFICANT CHANGE UP (ref 130–400)
POTASSIUM SERPL-MCNC: 4.3 MMOL/L — SIGNIFICANT CHANGE UP (ref 3.5–5)
POTASSIUM SERPL-SCNC: 4.3 MMOL/L — SIGNIFICANT CHANGE UP (ref 3.5–5)
RBC # BLD: 3.13 M/UL — LOW (ref 4.2–5.4)
RBC # FLD: 15.5 % — HIGH (ref 11.5–14.5)
SODIUM SERPL-SCNC: 139 MMOL/L — SIGNIFICANT CHANGE UP (ref 135–146)
SPECIMEN SOURCE: SIGNIFICANT CHANGE UP
WBC # BLD: 11.39 K/UL — HIGH (ref 4.8–10.8)
WBC # FLD AUTO: 11.39 K/UL — HIGH (ref 4.8–10.8)

## 2018-04-30 RX ORDER — METOPROLOL TARTRATE 50 MG
25 TABLET ORAL EVERY 8 HOURS
Qty: 0 | Refills: 0 | Status: DISCONTINUED | OUTPATIENT
Start: 2018-04-30 | End: 2018-05-03

## 2018-04-30 RX ORDER — FUROSEMIDE 40 MG
40 TABLET ORAL DAILY
Qty: 0 | Refills: 0 | Status: DISCONTINUED | OUTPATIENT
Start: 2018-04-30 | End: 2018-05-02

## 2018-04-30 RX ORDER — METOPROLOL TARTRATE 50 MG
25 TABLET ORAL EVERY 12 HOURS
Qty: 0 | Refills: 0 | Status: DISCONTINUED | OUTPATIENT
Start: 2018-04-30 | End: 2018-04-30

## 2018-04-30 RX ADMIN — Medication 25 MILLIGRAM(S): at 21:51

## 2018-04-30 RX ADMIN — ENOXAPARIN SODIUM 40 MILLIGRAM(S): 100 INJECTION SUBCUTANEOUS at 11:58

## 2018-04-30 RX ADMIN — Medication 100 MILLIGRAM(S): at 05:32

## 2018-04-30 RX ADMIN — MORPHINE SULFATE 2 MILLIGRAM(S): 50 CAPSULE, EXTENDED RELEASE ORAL at 17:00

## 2018-04-30 RX ADMIN — Medication 325 MILLIGRAM(S): at 11:58

## 2018-04-30 RX ADMIN — Medication 100 MILLIGRAM(S): at 18:06

## 2018-04-30 RX ADMIN — Medication 25 MILLIGRAM(S): at 16:13

## 2018-04-30 RX ADMIN — PANTOPRAZOLE SODIUM 40 MILLIGRAM(S): 20 TABLET, DELAYED RELEASE ORAL at 06:00

## 2018-04-30 RX ADMIN — SENNA PLUS 2 TABLET(S): 8.6 TABLET ORAL at 21:51

## 2018-04-30 RX ADMIN — Medication 40 MILLIGRAM(S): at 11:59

## 2018-04-30 RX ADMIN — Medication 12.5 MILLIGRAM(S): at 05:32

## 2018-04-30 RX ADMIN — Medication 0.12 MILLIGRAM(S): at 05:32

## 2018-04-30 RX ADMIN — MORPHINE SULFATE 2 MILLIGRAM(S): 50 CAPSULE, EXTENDED RELEASE ORAL at 16:12

## 2018-04-30 NOTE — PHYSICAL THERAPY INITIAL EVALUATION ADULT - LIVES WITH, PROFILE
Daughter visits daily. Daughter states pt will be going home with her. Daughter was recently discharged from [facility] for pelvic fracture. daughter uses a walker. states her daughter lives a block away/alone

## 2018-04-30 NOTE — PROGRESS NOTE ADULT - SUBJECTIVE AND OBJECTIVE BOX
SUBJECTIVE:    Patient is a 84y old Female who presents with a chief complaint of   Currently admitted to medicine with the primary diagnosis of Hypothermia, initial encounter     Today is hospital day 7d.   OVERNIGHT EVENTS  Today, patient is    PAST MEDICAL & SURGICAL HISTORY  HTN (hypertension)          ALLERGIES:  No Known Allergies    MEDICATIONS:  STANDING MEDICATIONS  digoxin     Tablet 0.125 milliGRAM(s) Oral daily  docusate sodium 100 milliGRAM(s) Oral two times a day  enoxaparin Injectable 40 milliGRAM(s) SubCutaneous daily  ferrous    sulfate 325 milliGRAM(s) Oral daily  furosemide   Injectable 40 milliGRAM(s) IV Push daily  metoprolol tartrate 25 milliGRAM(s) Oral every 12 hours  pantoprazole    Tablet 40 milliGRAM(s) Oral before breakfast  senna 2 Tablet(s) Oral at bedtime    PRN MEDICATIONS  morphine  - Injectable 2 milliGRAM(s) IV Push every 4 hours PRN  oxyCODONE    IR 5 milliGRAM(s) Oral every 6 hours PRN    VITALS:   T(F): 96.8  HR: 89  BP: 134/72  RR: 18  SpO2: 97%          LABS:                        9.6    11.39 )-----------( 186      ( 30 Apr 2018 06:36 )             29.9     04-30    139  |  101  |  19  ----------------------------<  96  4.3   |  27  |  <0.5<L>    Ca    7.6<L>      30 Apr 2018 06:36            RADIOLOGY:        PHYSICAL EXAM:  GEN: awake, alert  LUNGS: decreased  breath sounds at bases  HEART: RRR, S1 and S2  ABD: soft non tender, non distended  EXT: mild pitting edema  NEURO: oriented x 3

## 2018-04-30 NOTE — PROGRESS NOTE ADULT - SUBJECTIVE AND OBJECTIVE BOX
Patient seen & examined this am on rounds. Pain controlled. Reports being OOB to chair yesterday, had not yet gotten up with PT.    Vital Signs Last 24 Hrs  T(C): 36.4 (30 Apr 2018 13:39), Max: 36.5 (29 Apr 2018 20:35)  T(F): 97.6 (30 Apr 2018 13:39), Max: 97.7 (29 Apr 2018 20:35)  HR: 107 (30 Apr 2018 13:39) (89 - 110)  BP: 113/62 (30 Apr 2018 13:39) (113/62 - 143/73)  BP(mean): --  RR: 18 (30 Apr 2018 13:39) (18 - 18)  SpO2: 97% (30 Apr 2018 09:35) (93% - 97%)    AAOx3, NAD  unlabored breathing RA  RLE:  dressings w/minimal staining, removed; incisions well approximated no active drainage no erythema  new clean dressings applied  thigh/calf soft nontender  motor intact ehl/fhl/ta/gs  silt sp/dp/t  wwp                          9.6    11.39 )-----------( 186      ( 30 Apr 2018 06:36 )             29.9       84F POD#3 s/p R IT fx ORIF  - wbat  - PT/OT, oob to chair  - dvt ppx  - pain control   - IC, bowel regimen  - care per medical team  - ortho to follow

## 2018-04-30 NOTE — PROGRESS NOTE ADULT - SUBJECTIVE AND OBJECTIVE BOX
OZZY BROUSSARD  84y Female    INTERVAL HPI/OVERNIGHT EVENTS:  Patient seen and examined. Sitting comfortably in a chair. No palpitations. No SOB.    ROS: All other systems are negative.    Vital Signs:    T(F): 96.8 (18 @ 05:53), Max: 97.7 (18 @ 20:35)  HR: 110 (18 @ 05:53) (92 - 126)  BP: 143/73 (18 @ 05:53) (107/64 - 143/73)  RR: 18 (18 @ 20:35) (18 - 18)  SpO2: 97% (18 @ 09:35) (93% - 97%)  I&O's Summary    2018 07:01  -  2018 07:00  --------------------------------------------------------  IN: 0 mL / OUT: 251 mL / NET: -251 mL      Daily     Daily Weight in k (2018 05:53)  CAPILLARY BLOOD GLUCOSE          PHYSICAL EXAM:    GENERAL: Alert & oriented X 3. NAD  SKIN: No rashes or lesions  HENT: Atrumatic. Normocephalic. PERRL. Moist membranes.  NECK: Supple, No JVD. No lymphadenopathy.  PULMONARY: CTA B/L. No wheezing. No rales  CVS: Normal S1, S2. Rate and Rythm are IRIR. No murmurs, rubs or gallops.  ABDOMEN: Soft, Nontender, Nondistended; Bowel sounds present  EXTREMITIES:  1+ pitting edema B/L LE  NEUROLOGIC:  No motor or sensory deficit.    Consultant(s) Notes Reviewed:  [x ] YES  [ ] NO  Care Discussed with Consultants/Other Providers [ x] YES  [ ] NO    EKG reviewed  Telemetry reviewed    LABS:                        9.6    11.39 )-----------( 186      ( 2018 06:36 )             29.9         139  |  101  |  19  ----------------------------<  96  4.3   |  27  |  <0.5<L>    Ca    7.6<L>      2018 06:36        Serum Pro-Brain Natriuretic Peptide: 8476 pg/mL (18 @ 12:08)          RADIOLOGY & ADDITIONAL TESTS:      Imaging or report Personally Reviewed:  [ ] YES  [ ] NO    Medications:  Standing  digoxin     Tablet 0.125 milliGRAM(s) Oral daily  docusate sodium 100 milliGRAM(s) Oral two times a day  enoxaparin Injectable 40 milliGRAM(s) SubCutaneous daily  ferrous    sulfate 325 milliGRAM(s) Oral daily  metoprolol tartrate 25 milliGRAM(s) Oral every 12 hours  pantoprazole    Tablet 40 milliGRAM(s) Oral before breakfast  senna 2 Tablet(s) Oral at bedtime    PRN Meds  morphine  - Injectable 2 milliGRAM(s) IV Push every 4 hours PRN  oxyCODONE    IR 5 milliGRAM(s) Oral every 6 hours PRN      Case discussed with resident    Care discussed with pt/family

## 2018-04-30 NOTE — PROGRESS NOTE ADULT - ASSESSMENT
83 yo F with PMHx of macular degeneration, HTN, mild LV dysfunction, mild CAD (cath in 2013) and hx of syncopal episodes as per family, found down by family members last seen well 2 days prior, unknown down time    1.	High AGMA with acute hypercapneic respiratory failure 2/2 hypothermia (Resolved)  2.	Acute comminuted R intertrochanteric femoral fracture S/P IMN  3.   Acute minimally displaced posterior L 9th Rib Fracture   4.   Mild troponinemia and heart strain 2/2 new-onset paroxsymal AFib (BNP 8476)  5.   Rhabdomyolysis 2/2 mechanical fall (CK 2748)- resolved  6.   Multinodular goiter   7.   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   Mechanical fall   9.   Acute metabolic encephalopathy 2/2 acute bacterial cystitis - resolved  10.  A. Fib- rate controlled now.  12. Shock unspecified on admission        PLAN:    ·	Ortho F/U noted and care D/W the Ortho  ·	WBAT  ·	Rehab eval. OOB to Chair  ·	Change metoprolol to 25 mg po q 8h  ·	Pain control  ·	D/C all Abx.  ·	Plan of care D/W the pt and her daughter  ·	Will cont tele until discharge.  ·	CXR show Rt. pleural effusion. Will put her on lasix 40 mg ivp q 24h. 83 yo F with PMHx of macular degeneration, HTN, mild LV dysfunction, mild CAD (cath in 2013) and hx of syncopal episodes as per family, found down by family members last seen well 2 days prior, unknown down time    1.	High AGMA with acute hypercapneic respiratory failure 2/2 hypothermia (Resolved)  2.	Acute comminuted R intertrochanteric femoral fracture S/P IMN  3.   Acute minimally displaced posterior L 9th Rib Fracture   4.   Mild troponinemia and heart strain 2/2 new-onset paroxsymal AFib (BNP 8476)  5.   Rhabdomyolysis 2/2 mechanical fall (CK 2748)- resolved  6.   Multinodular goiter   7.   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   Mechanical fall   9.   Acute metabolic encephalopathy 2/2 acute bacterial cystitis - resolved  10.  A. Fib- rate controlled now.  12. Shock unspecified on admission  13. Rt. pleural effusion/Volume overload        PLAN:    ·	Ortho F/U noted and care D/W the Ortho  ·	WBAT  ·	Rehab eval. OOB to Chair  ·	Change metoprolol to 25 mg po q 8h  ·	Pain control  ·	D/C all Abx.  ·	Plan of care D/W the pt and her daughter  ·	Will cont tele until discharge.  ·	CXR show Rt. pleural effusion. Will put her on lasix 40 mg ivp q 24h.

## 2018-04-30 NOTE — PROGRESS NOTE ADULT - ASSESSMENT
84 y.o female with h/o HTN, syncopal episodes presents s/p apparent fall /w AMS, acute resp failure, hypothermia, skeletal fractures. Stepped down from ICU for ongoing management of R trochanteric fracture, rhabdomyolysis, new-onset A-fib /w RVR. Pt was transfused 1 unit prbc during her floor stay for pre-op orthopedic surgery.  Pt was s/p ORIF on 4/27 and has been stable since.  Pt not a candidate for Atrial fibrillation due to fall risk.  She has been on     # Anion Gap Metabolic Acidosis /w acute hypercapneic respiratory failure + hypothermia  -resolved    # Acute Commuted R intertrochanteric femoral fracture  - s/p ORIF.    - PT rehab.  d/c planning    #Right Pleural Effusion  -Lasix 40 mg IV q24.     # Acute minimally displaced L 9th rib fracture  - no intervention at this time    #Paroxysmal Atrial Fibrillation  -changed digoxin from IV to pO.  Pt on metoprolol 25 q 8.    -no A/C due to fall risk    #Thrombophlebitis  -per RN, both IV lines infiltrated with purulent discharge from the site of IV.    -Resolved. No more antibiotics.   -f/u wound cultures and blood cultures - negative so far.  Change Unasyn to augmentin and d/c on augmentin for 7 days.     #Rhabdomyolysis 2/2 mechanical fall: resolved    #Multinodular goiter (euthyroid)  -f/u as outpatient    #Mixed anemia: THIERRY + Anemia of chronic disease  -started on iron supplementation    #Acute metabolic encephalopathy 2/2 acute bacterial cystitis  -day discontinued.    -Resolved. No more antibiotics.     DNR/DNI  PT/rehab. D.c planning

## 2018-04-30 NOTE — OCCUPATIONAL THERAPY INITIAL EVALUATION ADULT - LEVEL OF INDEPENDENCE: BED TO CHAIR, REHAB EVAL
dependent (less than 25% patients effort)/pt unable to come to stand to complete full stand pivot. On second attempt pt completed sit pivot to bedside chair.

## 2018-05-01 LAB
ANION GAP SERPL CALC-SCNC: 13 MMOL/L — SIGNIFICANT CHANGE UP (ref 7–14)
BUN SERPL-MCNC: 19 MG/DL — SIGNIFICANT CHANGE UP (ref 10–20)
CALCIUM SERPL-MCNC: 7.2 MG/DL — LOW (ref 8.5–10.1)
CHLORIDE SERPL-SCNC: 102 MMOL/L — SIGNIFICANT CHANGE UP (ref 98–110)
CO2 SERPL-SCNC: 27 MMOL/L — SIGNIFICANT CHANGE UP (ref 17–32)
CREAT SERPL-MCNC: 0.5 MG/DL — LOW (ref 0.7–1.5)
CULTURE RESULTS: NO GROWTH — SIGNIFICANT CHANGE UP
CULTURE RESULTS: SIGNIFICANT CHANGE UP
GLUCOSE SERPL-MCNC: 110 MG/DL — HIGH (ref 70–99)
POTASSIUM SERPL-MCNC: 4.5 MMOL/L — SIGNIFICANT CHANGE UP (ref 3.5–5)
POTASSIUM SERPL-SCNC: 4.5 MMOL/L — SIGNIFICANT CHANGE UP (ref 3.5–5)
SODIUM SERPL-SCNC: 142 MMOL/L — SIGNIFICANT CHANGE UP (ref 135–146)
SPECIMEN SOURCE: SIGNIFICANT CHANGE UP
SPECIMEN SOURCE: SIGNIFICANT CHANGE UP

## 2018-05-01 RX ADMIN — PANTOPRAZOLE SODIUM 40 MILLIGRAM(S): 20 TABLET, DELAYED RELEASE ORAL at 06:35

## 2018-05-01 RX ADMIN — OXYCODONE HYDROCHLORIDE 5 MILLIGRAM(S): 5 TABLET ORAL at 18:16

## 2018-05-01 RX ADMIN — MORPHINE SULFATE 2 MILLIGRAM(S): 50 CAPSULE, EXTENDED RELEASE ORAL at 18:15

## 2018-05-01 RX ADMIN — MORPHINE SULFATE 2 MILLIGRAM(S): 50 CAPSULE, EXTENDED RELEASE ORAL at 10:20

## 2018-05-01 RX ADMIN — Medication 325 MILLIGRAM(S): at 13:27

## 2018-05-01 RX ADMIN — Medication 25 MILLIGRAM(S): at 13:27

## 2018-05-01 RX ADMIN — Medication 100 MILLIGRAM(S): at 06:35

## 2018-05-01 RX ADMIN — ENOXAPARIN SODIUM 40 MILLIGRAM(S): 100 INJECTION SUBCUTANEOUS at 13:27

## 2018-05-01 RX ADMIN — Medication 100 MILLIGRAM(S): at 17:21

## 2018-05-01 RX ADMIN — OXYCODONE HYDROCHLORIDE 5 MILLIGRAM(S): 5 TABLET ORAL at 13:28

## 2018-05-01 RX ADMIN — Medication 25 MILLIGRAM(S): at 06:38

## 2018-05-01 RX ADMIN — Medication 40 MILLIGRAM(S): at 06:35

## 2018-05-01 RX ADMIN — Medication 0.12 MILLIGRAM(S): at 06:35

## 2018-05-01 RX ADMIN — Medication 25 MILLIGRAM(S): at 21:51

## 2018-05-01 NOTE — PROGRESS NOTE ADULT - ASSESSMENT
84 y.o female with h/o HTN, syncopal episodes presents s/p apparent fall /w AMS, acute resp failure, hypothermia, skeletal fractures. Stepped down from ICU for ongoing management of R trochanteric fracture, rhabdomyolysis, new-onset A-fib /w RVR. Pt was transfused 1 unit prbc during her floor stay for pre-op orthopedic surgery.  Pt was s/p ORIF on 4/27 and has been stable since.  Pt not a candidate for Atrial fibrillation due to fall risk.  She has been stable since surgery.  Pt being seen by physical therapy and waiting authroziation for rehab    # Anion Gap Metabolic Acidosis /w acute hypercapneic respiratory failure + hypothermia  -resolved    # Acute Commuted R intertrochanteric femoral fracture  - s/p ORIF.    - PT rehab.  d/c planning    #Right Pleural Effusion  -change to PO    # Acute minimally displaced L 9th rib fracture  - no intervention at this time    #Paroxysmal Atrial Fibrillation  -changed digoxin from IV to pO.  Pt on metoprolol 25 q 8.    -no A/C due to fall risk    #Thrombophlebitis  -per RN, both IV lines infiltrated with purulent discharge from the site of IV.    -Resolved. No more antibiotics.   -f/u wound cultures and blood cultures - negative so far. d/c antibiotics    #Rhabdomyolysis 2/2 mechanical fall: resolved    #Multinodular goiter (euthyroid)  -f/u as outpatient    #Mixed anemia: THIERRY + Anemia of chronic disease  -started on iron supplementation    #Acute metabolic encephalopathy 2/2 acute bacterial cystitis  -day discontinued.    -Resolved. No more antibiotics.     DNR/DNI  PT/rehab. D.c planning

## 2018-05-01 NOTE — PROGRESS NOTE ADULT - SUBJECTIVE AND OBJECTIVE BOX
SUBJECTIVE:    Patient is a 84y old Female who presents with a chief complaint of   Currently admitted to medicine with the primary diagnosis of Hypothermia, initial encounter     Today is hospital day 8d.   OVERNIGHT EVENTS: none  Today, patient is doing well.  Seen by physical therapy but is dependant and unsteady.     PAST MEDICAL & SURGICAL HISTORY  HTN (hypertension)      ALLERGIES:  No Known Allergies    MEDICATIONS:  STANDING MEDICATIONS  digoxin     Tablet 0.125 milliGRAM(s) Oral daily  docusate sodium 100 milliGRAM(s) Oral two times a day  enoxaparin Injectable 40 milliGRAM(s) SubCutaneous daily  ferrous    sulfate 325 milliGRAM(s) Oral daily  furosemide   Injectable 40 milliGRAM(s) IV Push daily  metoprolol tartrate 25 milliGRAM(s) Oral every 8 hours  pantoprazole    Tablet 40 milliGRAM(s) Oral before breakfast  senna 2 Tablet(s) Oral at bedtime    PRN MEDICATIONS  morphine  - Injectable 2 milliGRAM(s) IV Push every 4 hours PRN  oxyCODONE    IR 5 milliGRAM(s) Oral every 6 hours PRN    VITALS:   T(F): 96.7  HR: 101  BP: 130/70  RR: 18  SpO2: 95%          LABS:                        9.6    11.39 )-----------( 186      ( 30 Apr 2018 06:36 )             29.9     05-01    142  |  102  |  19  ----------------------------<  110<H>  4.5   |  27  |  0.5<L>    Ca    7.2<L>      01 May 2018 09:36        RADIOLOGY:    PHYSICAL EXAM:  GEN: awake, alert  LUNGS: decreased  breath sounds at bases  HEART: RRR, S1 and S2  ABD: soft non tender, non distended  EXT: mild pitting edema  NEURO: oriented x 3

## 2018-05-01 NOTE — PROGRESS NOTE ADULT - SUBJECTIVE AND OBJECTIVE BOX
Patient seen & examined this am on rounds. Pain controlled. Reports being OOB to chair yesterday, had not yet gotten up with PT.    Vital Signs Last 24 Hrs  T(C): 36 (01 May 2018 06:06), Max: 36.4 (30 Apr 2018 13:39)  T(F): 96.8 (01 May 2018 06:06), Max: 97.6 (30 Apr 2018 13:39)  HR: 110 (01 May 2018 06:06) (89 - 110)  BP: 136/72 (01 May 2018 06:06) (113/62 - 145/73)  BP(mean): --  RR: 18 (01 May 2018 06:06) (18 - 18)  SpO2: 110% (01 May 2018 06:06) (94% - 110%)    AAOx3, NAD  unlabored breathing RA  RLE:  proximal dressing was not intact some staining unclear if from external source or drainage; wound well approximated no active drainage no erythema  distal dressings c/d/i  thigh/calf soft nontender  motor intact ehl/fhl/ta/gs  silt sp/dp/t  wwp                          9.6    11.39 )-----------( 186      ( 30 Apr 2018 06:36 )             29.9         84F POD#4 s/p R IT fx ORIF  - wbat  - PT/OT, oob to chair  - dvt ppx  - pain control   - IC, bowel regimen  - care per medical team  - ortho to follow

## 2018-05-01 NOTE — PROGRESS NOTE ADULT - SUBJECTIVE AND OBJECTIVE BOX
OZZY BROUSSARD  84y Female    INTERVAL HPI/OVERNIGHT EVENTS:  Patient seen and examined. No SOB. Off O2. No pain    ROS: All other systems are negative.    Vital Signs:    T(F): 96.7 (05-01-18 @ 13:26), Max: 96.8 (05-01-18 @ 06:06)  HR: 101 (05-01-18 @ 13:26) (92 - 110)  BP: 130/70 (05-01-18 @ 13:26) (130/70 - 145/73)  RR: 18 (05-01-18 @ 13:26) (18 - 18)  SpO2: 95% (05-01-18 @ 14:18) (94% - 110%)  I&O's Summary    Daily     Daily   CAPILLARY BLOOD GLUCOSE          PHYSICAL EXAM:    GENERAL: Alert & oriented X 3. NAD  SKIN: No rashes or lesions  HENT: Atrumatic. Normocephalic. PERRL. Moist membranes.  NECK: Supple, No JVD. No lymphadenopathy.  PULMONARY: CTA B/L. No wheezing. No rales  CVS: Normal S1, S2. Rate and Rythm are regular. No murmurs, rubs or gallops.  ABDOMEN: Soft, Nontender, Nondistended; Bowel sounds present  EXTREMITIES: Peripheral pulses intact. 1+ pitting edema B/L LE.  NEUROLOGIC:  No motor or sensory deficit.    Consultant(s) Notes Reviewed:  [x ] YES  [ ] NO  Care Discussed with Consultants/Other Providers [ x] YES  [ ] NO    EKG reviewed  Telemetry reviewed    LABS:                        9.6    11.39 )-----------( 186      ( 30 Apr 2018 06:36 )    Hemoglobin: 9.6 g/dL (04-30 @ 06:36)  Hemoglobin: 10.0 g/dL (04-29 @ 08:53)  Hemoglobin: 10.5 g/dL (04-28 @ 06:21)  Hemoglobin: 10.1 g/dL (04-27 @ 07:08)  Hemoglobin: 10.1 g/dL (04-27 @ 00:25)           29.9     05-01    142  |  102  |  19  ----------------------------<  110<H>  4.5   |  27  |  0.5<L>    Ca    7.2<L>      01 May 2018 09:36                RADIOLOGY & ADDITIONAL TESTS:      Imaging or report Personally Reviewed:  [ ] YES  [ ] NO    Medications:  Standing  digoxin     Tablet 0.125 milliGRAM(s) Oral daily  docusate sodium 100 milliGRAM(s) Oral two times a day  enoxaparin Injectable 40 milliGRAM(s) SubCutaneous daily  ferrous    sulfate 325 milliGRAM(s) Oral daily  furosemide   Injectable 40 milliGRAM(s) IV Push daily  metoprolol tartrate 25 milliGRAM(s) Oral every 8 hours  pantoprazole    Tablet 40 milliGRAM(s) Oral before breakfast  senna 2 Tablet(s) Oral at bedtime    PRN Meds  morphine  - Injectable 2 milliGRAM(s) IV Push every 4 hours PRN  oxyCODONE    IR 5 milliGRAM(s) Oral every 6 hours PRN      Case discussed with resident    Care discussed with pt/family

## 2018-05-01 NOTE — PROGRESS NOTE ADULT - ASSESSMENT
85 yo F with PMHx of macular degeneration, HTN, mild LV dysfunction, mild CAD (cath in 2013) and hx of syncopal episodes as per family, found down by family members last seen well 2 days prior, unknown down time    1.	High AGMA with acute hypercapneic respiratory failure 2/2 hypothermia (Resolved)  2.	Acute comminuted R intertrochanteric femoral fracture S/P IMN  3.   Acute minimally displaced posterior L 9th Rib Fracture   4.   Mild troponinemia and heart strain 2/2 new-onset paroxsymal AFib (BNP 8476)  5.   Rhabdomyolysis 2/2 mechanical fall (CK 2748)- resolved  6.   Multinodular goiter   7.   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   Mechanical fall   9.   Acute metabolic encephalopathy 2/2 acute bacterial cystitis - resolved  10.  A. Fib- rate controlled now.  12. Shock unspecified on admission  13. Rt. pleural effusion/Volume overload        PLAN:    ·	WBAT  ·	OOB to Chair  ·	Change metoprolol to 25 mg po q 8h  ·	Pain control  ·	D/C all Abx.  ·	Plan of care D/W the pt and her daughter  ·	Will cont tele until discharge.  ·	Change lasix to 40 mg po q 24h.

## 2018-05-02 LAB
ANION GAP SERPL CALC-SCNC: 9 MMOL/L — SIGNIFICANT CHANGE UP (ref 7–14)
BUN SERPL-MCNC: 20 MG/DL — SIGNIFICANT CHANGE UP (ref 10–20)
CALCIUM SERPL-MCNC: 7.6 MG/DL — LOW (ref 8.5–10.1)
CHLORIDE SERPL-SCNC: 100 MMOL/L — SIGNIFICANT CHANGE UP (ref 98–110)
CO2 SERPL-SCNC: 30 MMOL/L — SIGNIFICANT CHANGE UP (ref 17–32)
CREAT SERPL-MCNC: 0.5 MG/DL — LOW (ref 0.7–1.5)
CULTURE RESULTS: SIGNIFICANT CHANGE UP
GLUCOSE SERPL-MCNC: 105 MG/DL — HIGH (ref 70–99)
POTASSIUM SERPL-MCNC: 4 MMOL/L — SIGNIFICANT CHANGE UP (ref 3.5–5)
POTASSIUM SERPL-SCNC: 4 MMOL/L — SIGNIFICANT CHANGE UP (ref 3.5–5)
SODIUM SERPL-SCNC: 139 MMOL/L — SIGNIFICANT CHANGE UP (ref 135–146)
SPECIMEN SOURCE: SIGNIFICANT CHANGE UP

## 2018-05-02 RX ORDER — FUROSEMIDE 40 MG
40 TABLET ORAL DAILY
Qty: 0 | Refills: 0 | Status: DISCONTINUED | OUTPATIENT
Start: 2018-05-02 | End: 2018-05-03

## 2018-05-02 RX ORDER — SODIUM CHLORIDE 9 MG/ML
500 INJECTION INTRAMUSCULAR; INTRAVENOUS; SUBCUTANEOUS ONCE
Qty: 0 | Refills: 0 | Status: DISCONTINUED | OUTPATIENT
Start: 2018-05-02 | End: 2018-05-02

## 2018-05-02 RX ADMIN — ENOXAPARIN SODIUM 40 MILLIGRAM(S): 100 INJECTION SUBCUTANEOUS at 12:43

## 2018-05-02 RX ADMIN — Medication 40 MILLIGRAM(S): at 21:10

## 2018-05-02 RX ADMIN — Medication 25 MILLIGRAM(S): at 06:02

## 2018-05-02 RX ADMIN — Medication 100 MILLIGRAM(S): at 17:32

## 2018-05-02 RX ADMIN — Medication 25 MILLIGRAM(S): at 13:27

## 2018-05-02 RX ADMIN — Medication 0.12 MILLIGRAM(S): at 06:02

## 2018-05-02 RX ADMIN — MORPHINE SULFATE 2 MILLIGRAM(S): 50 CAPSULE, EXTENDED RELEASE ORAL at 02:48

## 2018-05-02 RX ADMIN — MORPHINE SULFATE 2 MILLIGRAM(S): 50 CAPSULE, EXTENDED RELEASE ORAL at 21:09

## 2018-05-02 RX ADMIN — MORPHINE SULFATE 2 MILLIGRAM(S): 50 CAPSULE, EXTENDED RELEASE ORAL at 06:33

## 2018-05-02 RX ADMIN — Medication 40 MILLIGRAM(S): at 06:01

## 2018-05-02 RX ADMIN — MORPHINE SULFATE 2 MILLIGRAM(S): 50 CAPSULE, EXTENDED RELEASE ORAL at 06:50

## 2018-05-02 RX ADMIN — Medication 25 MILLIGRAM(S): at 21:10

## 2018-05-02 RX ADMIN — Medication 325 MILLIGRAM(S): at 12:43

## 2018-05-02 RX ADMIN — MORPHINE SULFATE 2 MILLIGRAM(S): 50 CAPSULE, EXTENDED RELEASE ORAL at 02:18

## 2018-05-02 RX ADMIN — PANTOPRAZOLE SODIUM 40 MILLIGRAM(S): 20 TABLET, DELAYED RELEASE ORAL at 06:03

## 2018-05-02 NOTE — PROGRESS NOTE ADULT - SUBJECTIVE AND OBJECTIVE BOX
SUBJECTIVE:    Patient is a 84y old Female who presents with a chief complaint of   Currently admitted to medicine with the primary diagnosis of Hypothermia, initial encounter     Today is hospital day 9d.   OVERNIGHT EVENTS:  none  Today, patient is doing well.  Ready to go for rehab    PAST MEDICAL & SURGICAL HISTORY  HTN (hypertension)          ALLERGIES:  No Known Allergies    MEDICATIONS:  STANDING MEDICATIONS  digoxin     Tablet 0.125 milliGRAM(s) Oral daily  docusate sodium 100 milliGRAM(s) Oral two times a day  enoxaparin Injectable 40 milliGRAM(s) SubCutaneous daily  ferrous    sulfate 325 milliGRAM(s) Oral daily  furosemide    Tablet 40 milliGRAM(s) Oral daily  metoprolol tartrate 25 milliGRAM(s) Oral every 8 hours  pantoprazole    Tablet 40 milliGRAM(s) Oral before breakfast  senna 2 Tablet(s) Oral at bedtime    PRN MEDICATIONS  morphine  - Injectable 2 milliGRAM(s) IV Push every 4 hours PRN  oxyCODONE    IR 5 milliGRAM(s) Oral every 6 hours PRN    VITALS:   T(F): 98  HR: 91  BP: 114/58  RR: 17  SpO2: 95%          LABS:    05-02    139  |  100  |  20  ----------------------------<  105<H>  4.0   |  30  |  0.5<L>    Ca    7.6<L>      02 May 2018 07:01            RADIOLOGY:    PHYSICAL EXAM:  GEN: awake, alert  LUNGS: ctab  HEART: irregular  ABD: soft non tender, non distended  EXT: no edema

## 2018-05-02 NOTE — PROGRESS NOTE ADULT - SUBJECTIVE AND OBJECTIVE BOX
BOBO, ADELE  84y Female    INTERVAL HPI/OVERNIGHT EVENTS:  Patient seen and examined. As per orthopedic pt's wound has been draining. They did not clear the pt to discharge. No new complaint.    ROS: All other systems are negative.    Vital Signs:    T(F): 98 (05-02-18 @ 12:44), Max: 98 (05-02-18 @ 12:44)  HR: 91 (05-02-18 @ 12:44) (91 - 101)  BP: 114/58 (05-02-18 @ 12:44) (114/58 - 157/71)  RR: 17 (05-02-18 @ 12:44) (17 - 18)  SpO2: 95% (05-01-18 @ 23:54) (95% - 95%)  I&O's Summary    Daily     Daily   CAPILLARY BLOOD GLUCOSE          PHYSICAL EXAM:    GENERAL: Alert & oriented X 3. NAD  SKIN: No rashes or lesions  HENT: Atrumatic. Normocephalic. PERRL. Moist membranes.  NECK: Supple, No JVD. No lymphadenopathy.  PULMONARY: CTA B/L. No wheezing. No rales  CVS: Normal S1, S2. Rate and Rythm are regular. No murmurs, rubs or gallops.  ABDOMEN: Soft, Nontender, Nondistended; Bowel sounds present  EXTREMITIES: 1+ pitting edema B/L  NEUROLOGIC:  No motor or sensory deficit.    Consultant(s) Notes Reviewed:  [x ] YES  [ ] NO    LABS:    05-02    139  |  100  |  20  ----------------------------<  105<H>  4.0   |  30  |  0.5<L>    Ca    7.6<L>      02 May 2018 07:01                RADIOLOGY & ADDITIONAL TESTS:      Imaging or report Personally Reviewed:  [ ] YES  [ ] NO    Medications:  Standing  digoxin     Tablet 0.125 milliGRAM(s) Oral daily  docusate sodium 100 milliGRAM(s) Oral two times a day  enoxaparin Injectable 40 milliGRAM(s) SubCutaneous daily  ferrous    sulfate 325 milliGRAM(s) Oral daily  furosemide    Tablet 40 milliGRAM(s) Oral daily  metoprolol tartrate 25 milliGRAM(s) Oral every 8 hours  pantoprazole    Tablet 40 milliGRAM(s) Oral before breakfast  senna 2 Tablet(s) Oral at bedtime    PRN Meds  morphine  - Injectable 2 milliGRAM(s) IV Push every 4 hours PRN  oxyCODONE    IR 5 milliGRAM(s) Oral every 6 hours PRN      Case discussed with resident    Care discussed with pt/family

## 2018-05-02 NOTE — PROGRESS NOTE ADULT - ASSESSMENT
84 y.o female with h/o HTN, syncopal episodes presents s/p apparent fall /w AMS, acute resp failure, hypothermia, skeletal fractures. Stepped down from ICU for ongoing management of R trochanteric fracture, rhabdomyolysis, new-onset A-fib /w RVR. Pt was transfused 1 unit prbc during her floor stay for pre-op orthopedic surgery.  Pt was s/p ORIF on 4/27 and has been stable since.  Pt not a candidate for Atrial fibrillation due to fall risk.  She has been stable since surgery.  Pt being seen by physical therapy and waiting authroziation for rehab    # Anion Gap Metabolic Acidosis /w acute hypercapneic respiratory failure + hypothermia  -resolved    # Acute Commuted R intertrochanteric femoral fracture  - s/p ORIF.    - PT rehab.    -per ortho, there is serous discharge on the wound but wound is clean and healing well.  They recommend monitoring one more day.  -Pt has bed in [facility]. D/c tomorrow    #Right Pleural Effusion  -resolved    # Acute minimally displaced L 9th rib fracture  - no intervention at this time    #Paroxysmal Atrial Fibrillation  -changed digoxin from IV to pO.  Pt on metoprolol 25 q 8.    -no A/C due to fall risk    #Thrombophlebitis  -resolved    #Rhabdomyolysis 2/2 mechanical fall: resolved    #Multinodular goiter (euthyroid)  -f/u as outpatient    #Mixed anemia: THIERRY + Anemia of chronic disease  -started on iron supplementation    #Acute metabolic encephalopathy 2/2 acute bacterial cystitis  -Resolved. No more antibiotics.     DNR/DNI  PT/rehab.   Dc/ tomorrow

## 2018-05-02 NOTE — PROGRESS NOTE ADULT - SUBJECTIVE AND OBJECTIVE BOX
Patient seen & examined this am on rounds. Pain controlled. Reports being OOB to chair yesterday, had not yet gotten up with PT.    Vital Signs Last 24 Hrs  T(C): 35.6 (01 May 2018 20:15), Max: 35.9 (01 May 2018 13:26)  T(F): 96 (01 May 2018 20:15), Max: 96.7 (01 May 2018 13:26)  HR: 94 (01 May 2018 20:15) (94 - 101)  BP: 157/71 (01 May 2018 20:15) (130/70 - 157/71)  BP(mean): --  RR: 18 (01 May 2018 20:15) (18 - 18)  SpO2: 95% (01 May 2018 23:54) (95% - 95%)    AAOx3, NAD  unlabored breathing RA  RLE:  proximal dressing significant serous staining; distal dressings minimal serous staining; all changed no wound erythema well approximated  thigh/calf soft nontender  motor intact ehl/fhl/ta/gs  silt sp/dp/t  wwp    84F POD#5 s/p R IT fx ORIF; increase in drainage from wounds however serous appearing low concern for infection  - patient not ready for discharge from orthopedic perspective until wound starts draining less  - wbat  - PT/OT, oob to chair  - dvt ppx  - pain control   - IC, bowel regimen  - care per medical team  - ortho to follow

## 2018-05-02 NOTE — PROGRESS NOTE ADULT - ASSESSMENT
85 yo F with PMHx of macular degeneration, HTN, mild LV dysfunction, mild CAD (cath in 2013) and hx of syncopal episodes as per family, found down by family members last seen well 2 days prior, unknown down time    1.	High AGMA with acute hypercapneic respiratory failure 2/2 hypothermia (Resolved)  2.	Acute comminuted R intertrochanteric femoral fracture S/P IMN  3.   Acute minimally displaced posterior L 9th Rib Fracture   4.   Mild troponinemia and heart strain 2/2 new-onset paroxsymal AFib (BNP 8476)  5.   Rhabdomyolysis 2/2 mechanical fall (CK 2748)- resolved  6.   Multinodular goiter   7.   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   Mechanical fall   9.   Acute metabolic encephalopathy 2/2 acute bacterial cystitis - resolved  10.  A. Fib- rate controlled now.  12. Shock unspecified on admission  13. Rt. pleural effusion/Volume overload        PLAN:    ·	WBAT  ·	OOB to Chair  ·	Cont metoprolol 25 mg po q 8h  ·	Pain control  ·	Plan of care D/W the pt and her daughter  ·	Cont lasix 40 mg po q 24h.    14. Discharge from Rt. Hip surgical wound    ·	Local wound care  ·	Ortho on board

## 2018-05-03 VITALS
HEART RATE: 91 BPM | TEMPERATURE: 99 F | DIASTOLIC BLOOD PRESSURE: 68 MMHG | SYSTOLIC BLOOD PRESSURE: 142 MMHG | RESPIRATION RATE: 17 BRPM

## 2018-05-03 RX ORDER — OXYCODONE HYDROCHLORIDE 5 MG/1
1 TABLET ORAL
Qty: 0 | Refills: 0 | COMMUNITY
Start: 2018-05-03

## 2018-05-03 RX ORDER — SENNA PLUS 8.6 MG/1
2 TABLET ORAL
Qty: 0 | Refills: 0 | COMMUNITY
Start: 2018-05-03

## 2018-05-03 RX ORDER — ENOXAPARIN SODIUM 100 MG/ML
40 INJECTION SUBCUTANEOUS
Qty: 0 | Refills: 0 | COMMUNITY
Start: 2018-05-03

## 2018-05-03 RX ORDER — FUROSEMIDE 40 MG
1 TABLET ORAL
Qty: 0 | Refills: 0 | COMMUNITY
Start: 2018-05-03 | End: 2018-05-08

## 2018-05-03 RX ORDER — FERROUS SULFATE 325(65) MG
1 TABLET ORAL
Qty: 0 | Refills: 0 | COMMUNITY
Start: 2018-05-03

## 2018-05-03 RX ORDER — DIGOXIN 250 MCG
1 TABLET ORAL
Qty: 0 | Refills: 0 | COMMUNITY
Start: 2018-05-03

## 2018-05-03 RX ORDER — DOCUSATE SODIUM 100 MG
1 CAPSULE ORAL
Qty: 0 | Refills: 0 | COMMUNITY
Start: 2018-05-03

## 2018-05-03 RX ADMIN — Medication 0.12 MILLIGRAM(S): at 06:10

## 2018-05-03 RX ADMIN — Medication 325 MILLIGRAM(S): at 12:36

## 2018-05-03 RX ADMIN — Medication 100 MILLIGRAM(S): at 06:10

## 2018-05-03 RX ADMIN — Medication 25 MILLIGRAM(S): at 13:41

## 2018-05-03 RX ADMIN — ENOXAPARIN SODIUM 40 MILLIGRAM(S): 100 INJECTION SUBCUTANEOUS at 12:36

## 2018-05-03 RX ADMIN — OXYCODONE HYDROCHLORIDE 5 MILLIGRAM(S): 5 TABLET ORAL at 15:43

## 2018-05-03 NOTE — DISCHARGE NOTE ADULT - PLAN OF CARE
Resolution You were dehydrated and had lab abnormalities when you first came to the hospital but they have all resolved.  Please follow up with your doctor. You had a hip replacement and you will need physical therapy

## 2018-05-03 NOTE — PROGRESS NOTE ADULT - ASSESSMENT
84F POD#6 s/p R IT fx ORIF  - patient can be d/cd to SNF  - wbat  - PT/OT, oob to chair  - dvt ppx  - pain control   - IC, bowel regimen  - care per medical team

## 2018-05-03 NOTE — PROGRESS NOTE ADULT - SUBJECTIVE AND OBJECTIVE BOX
BOBOOZZY  84y Female    INTERVAL HPI/OVERNIGHT EVENTS:  Patient seen and examined. No pain. Feels better. Wound discharge decreased. Ortho cleared to D/C her.    ROS: All other systems are negative.    Vital Signs:    T(F): 98.8 (05-03-18 @ 12:52), Max: 98.8 (05-03-18 @ 12:52)  HR: 91 (05-03-18 @ 12:52) (86 - 91)  BP: 142/68 (05-03-18 @ 12:52) (142/68 - 175/77)  RR: 17 (05-03-18 @ 12:52) (17 - 18)  SpO2: 94% (05-03-18 @ 05:30) (94% - 94%)  I&O's Summary    Daily     Daily   CAPILLARY BLOOD GLUCOSE          PHYSICAL EXAM:    GENERAL: Alert & oriented X 3. NAD  SKIN: No rashes or lesions  HENT: Atrumatic. Normocephalic. PERRL. Moist membranes.  NECK: Supple, No JVD. No lymphadenopathy.  PULMONARY: CTA B/L. No wheezing. No rales  CVS: Normal S1, S2. Rate and Rythm are regular. No murmurs, rubs or gallops.  ABDOMEN: Soft, Nontender, Nondistended; Bowel sounds present  EXTREMITIES: Trace edema of the legs B/L.  NEUROLOGIC:  No motor or sensory deficit.    Consultant(s) Notes Reviewed:  [x ] YES  [ ] NO  Care Discussed with Consultants/Other Providers [ x] YES  [ ] NO    EKG reviewed  Telemetry reviewed    LABS:    05-02    139  |  100  |  20  ----------------------------<  105<H>  4.0   |  30  |  0.5<L>    Ca    7.6<L>      02 May 2018 07:01                RADIOLOGY & ADDITIONAL TESTS:      Imaging or report Personally Reviewed:  [ ] YES  [ ] NO    Medications:  Standing  digoxin     Tablet 0.125 milliGRAM(s) Oral daily  docusate sodium 100 milliGRAM(s) Oral two times a day  enoxaparin Injectable 40 milliGRAM(s) SubCutaneous daily  ferrous    sulfate 325 milliGRAM(s) Oral daily  furosemide    Tablet 40 milliGRAM(s) Oral daily  metoprolol tartrate 25 milliGRAM(s) Oral every 8 hours  pantoprazole    Tablet 40 milliGRAM(s) Oral before breakfast  senna 2 Tablet(s) Oral at bedtime    PRN Meds  morphine  - Injectable 2 milliGRAM(s) IV Push every 4 hours PRN  oxyCODONE    IR 5 milliGRAM(s) Oral every 6 hours PRN      Case discussed with resident    Care discussed with pt/family

## 2018-05-03 NOTE — DISCHARGE NOTE ADULT - MEDICATION SUMMARY - MEDICATIONS TO TAKE
I will START or STAY ON the medications listed below when I get home from the hospital:    oxyCODONE 5 mg oral tablet  -- 1 tab(s) by mouth every 6 hours, As needed, Moderate Pain (4 - 6)  -- Indication: For Pain    digoxin 125 mcg (0.125 mg) oral tablet  -- 1 tab(s) by mouth once a day  -- Indication: For For afibb    enoxaparin  -- 40 unit(s) subcutaneous once a day  -- Indication: For Dvt prophylaxis    furosemide 40 mg oral tablet  -- 1 tab(s) by mouth once a day for Five days.  Last day 5/8/18.   -- Indication: For Diuretic    FeroSul 325 mg (65 mg elemental iron) oral tablet  -- 1 tab(s) by mouth 3 times a day  -- Indication: For Iron supplement    docusate sodium 100 mg oral capsule  -- 1 cap(s) by mouth 2 times a day  -- Indication: For Constipation    senna oral tablet  -- 2 tab(s) by mouth once a day (at bedtime)  -- Indication: For Constipation

## 2018-05-03 NOTE — DISCHARGE NOTE ADULT - PATIENT PORTAL LINK FT
You can access the [facility] Patient Portal, offered by [facility], by registering with the following website: [email or web address]

## 2018-05-03 NOTE — PROGRESS NOTE ADULT - SUBJECTIVE AND OBJECTIVE BOX
84 y o F s/p right IT fx ORIF pod 6    Pt seen and examined at bedside    NAD    Vital Signs Last 24 Hrs  T(C): 36 (03 May 2018 05:30), Max: 37.1 (02 May 2018 20:10)  T(F): 96.8 (03 May 2018 05:30), Max: 98.7 (02 May 2018 20:10)  HR: 86 (03 May 2018 05:30) (86 - 91)  BP: 175/77 (03 May 2018 05:30) (114/58 - 175/77)  BP(mean): --  RR: 18 (03 May 2018 05:30) (17 - 18)  SpO2: 94% (03 May 2018 05:30) (94% - 94%)    RLE:  proximal dressing mild serous staining; distal dressings d/c/i all changed no wound erythema well approximated  compartments soft nontender  motor intact  sensation grossly intact  foot well perfused

## 2018-05-03 NOTE — DISCHARGE NOTE ADULT - CARE PROVIDER_API CALL
Delbert Francis (MD), Orthopaedic Surgery  Formerly [facility] [place] [address]  Phone: [phone]  Fax: [phone]

## 2018-05-03 NOTE — PROGRESS NOTE ADULT - PROVIDER SPECIALTY LIST ADULT
Cardiology
Critical Care
Hospitalist
Hospitalist
Internal Medicine
Orthopedics
Internal Medicine

## 2018-05-03 NOTE — DISCHARGE NOTE ADULT - CARE PLAN
Principal Discharge DX:	Hypothermia, initial encounter  Goal:	Resolution  Assessment and plan of treatment:	You were dehydrated and had lab abnormalities when you first came to the hospital but they have all resolved.  Please follow up with your doctor.  Secondary Diagnosis:	Hip fracture  Goal:	Resolution  Assessment and plan of treatment:	You had a hip replacement and you will need physical therapy

## 2018-05-03 NOTE — PROGRESS NOTE ADULT - ASSESSMENT
85 yo F with PMHx of macular degeneration, HTN, mild LV dysfunction, mild CAD (cath in 2013) and hx of syncopal episodes as per family, found down by family members last seen well 2 days prior, unknown down time    1.	High AGMA with acute hypercapneic respiratory failure 2/2 hypothermia (Resolved)  2.	Acute comminuted R intertrochanteric femoral fracture S/P IMN  3.   Acute minimally displaced posterior L 9th Rib Fracture   4.   Mild troponinemia and heart strain 2/2 new-onset paroxsymal AFib (BNP 8476)  5.   Rhabdomyolysis 2/2 mechanical fall (CK 2748)- resolved  6.   Multinodular goiter   7.   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   Mechanical fall   9.   Acute metabolic encephalopathy 2/2 acute bacterial cystitis - resolved  10.  A. Fib- rate controlled now.  12. Shock unspecified on admission  13. Rt. pleural effusion/Volume overload        PLAN:  ·	Ortho F/U noted. Cleared to D/C her to SNF  ·	WBAT.   ·	OOB to Chair  ·	Change metoprolol to metoprolol succinate 75 mg po q 24h  ·	Pain control  ·	Plan of care D/W the pt and her daughter  ·	Cont lasix 40 mg po q 24h. for 5 more days, then D/C it      * Med rec reviewed. Plan of care D/W the pt and her daughter on bedside. Time spent 38 minutes.

## 2018-05-03 NOTE — DISCHARGE NOTE ADULT - HOSPITAL COURSE
84 y.o female with h/o HTN, syncopal episodes presents s/p apparent fall /w AMS, acute resp failure, hypothermia, skeletal fractures. Stepped down from ICU for ongoing management of R trochanteric fracture, rhabdomyolysis, new-onset A-fib /w RVR. Pt was transfused 1 unit prbc during her floor stay for pre-op orthopedic surgery.  Pt was s/p ORIF on 4/27 and has been stable since.  Pt not a candidate for Atrial fibrillation due to fall risk.  She has been stable since surgery.  Pt being seen by physical therapy.  Pt stable for discharge for Rehab facility.

## 2018-05-07 ENCOUNTER — OUTPATIENT (OUTPATIENT)
Dept: OUTPATIENT SERVICES | Facility: HOSPITAL | Age: 83
LOS: 1 days | Discharge: HOME | End: 2018-05-07

## 2018-05-07 DIAGNOSIS — I50.9 HEART FAILURE, UNSPECIFIED: ICD-10-CM

## 2018-05-07 DIAGNOSIS — Z79.899 OTHER LONG TERM (CURRENT) DRUG THERAPY: ICD-10-CM

## 2018-05-07 DIAGNOSIS — D64.9 ANEMIA, UNSPECIFIED: ICD-10-CM

## 2018-05-07 DIAGNOSIS — N39.0 URINARY TRACT INFECTION, SITE NOT SPECIFIED: ICD-10-CM

## 2018-05-07 PROBLEM — I10 ESSENTIAL (PRIMARY) HYPERTENSION: Chronic | Status: ACTIVE | Noted: 2018-04-23

## 2018-05-08 DIAGNOSIS — D63.8 ANEMIA IN OTHER CHRONIC DISEASES CLASSIFIED ELSEWHERE: ICD-10-CM

## 2018-05-08 DIAGNOSIS — S22.32XA FRACTURE OF ONE RIB, LEFT SIDE, INITIAL ENCOUNTER FOR CLOSED FRACTURE: ICD-10-CM

## 2018-05-08 DIAGNOSIS — G93.41 METABOLIC ENCEPHALOPATHY: ICD-10-CM

## 2018-05-08 DIAGNOSIS — R40.2352 COMA SCALE, BEST MOTOR RESPONSE, LOCALIZES PAIN, AT ARRIVAL TO EMERGENCY DEPARTMENT: ICD-10-CM

## 2018-05-08 DIAGNOSIS — R33.9 RETENTION OF URINE, UNSPECIFIED: ICD-10-CM

## 2018-05-08 DIAGNOSIS — R40.2122 COMA SCALE, EYES OPEN, TO PAIN, AT ARRIVAL TO EMERGENCY DEPARTMENT: ICD-10-CM
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
## 2018-05-08 DIAGNOSIS — E83.42 HYPOMAGNESEMIA: ICD-10-CM

## 2018-05-08 DIAGNOSIS — J90 PLEURAL EFFUSION, NOT ELSEWHERE CLASSIFIED: ICD-10-CM

## 2018-05-08 DIAGNOSIS — T68.XXXA HYPOTHERMIA, INITIAL ENCOUNTER: ICD-10-CM

## 2018-05-08 DIAGNOSIS — I95.9 HYPOTENSION, UNSPECIFIED: ICD-10-CM

## 2018-05-08 DIAGNOSIS — I10 ESSENTIAL (PRIMARY) HYPERTENSION: ICD-10-CM

## 2018-05-08 DIAGNOSIS — E04.2 NONTOXIC MULTINODULAR GOITER: ICD-10-CM

## 2018-05-08 DIAGNOSIS — N30.00 ACUTE CYSTITIS WITHOUT HEMATURIA: ICD-10-CM

## 2018-05-08 DIAGNOSIS — R57.9 SHOCK, UNSPECIFIED: ICD-10-CM

## 2018-05-08 DIAGNOSIS — E87.5 HYPERKALEMIA: ICD-10-CM

## 2018-05-08 DIAGNOSIS — S72.141A DISPLACED INTERTROCHANTERIC FRACTURE OF RIGHT FEMUR, INITIAL ENCOUNTER FOR CLOSED FRACTURE: ICD-10-CM

## 2018-05-08 DIAGNOSIS — I80.9 PHLEBITIS AND THROMBOPHLEBITIS OF UNSPECIFIED SITE: ICD-10-CM

## 2018-05-08 DIAGNOSIS — D50.9 IRON DEFICIENCY ANEMIA, UNSPECIFIED: ICD-10-CM

## 2018-05-08 DIAGNOSIS — J96.02 ACUTE RESPIRATORY FAILURE WITH HYPERCAPNIA: ICD-10-CM

## 2018-05-08 DIAGNOSIS — Z66 DO NOT RESUSCITATE: ICD-10-CM

## 2018-05-08 DIAGNOSIS — I48.0 PAROXYSMAL ATRIAL FIBRILLATION: ICD-10-CM
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
## 2018-05-08 DIAGNOSIS — D72.829 ELEVATED WHITE BLOOD CELL COUNT, UNSPECIFIED: ICD-10-CM

## 2018-05-08 DIAGNOSIS — I25.10 ATHEROSCLEROTIC HEART DISEASE OF NATIVE CORONARY ARTERY WITHOUT ANGINA PECTORIS: ICD-10-CM

## 2018-05-08 DIAGNOSIS — I51.9 HEART DISEASE, UNSPECIFIED: ICD-10-CM

## 2018-05-08 DIAGNOSIS — E87.2 ACIDOSIS: ICD-10-CM

## 2018-05-08 DIAGNOSIS — R40.2212 COMA SCALE, BEST VERBAL RESPONSE, NONE, AT ARRIVAL TO EMERGENCY DEPARTMENT: ICD-10-CM

## 2018-05-08 DIAGNOSIS — R55 SYNCOPE AND COLLAPSE: ICD-10-CM

## 2018-05-08 DIAGNOSIS — T79.6XXA TRAUMATIC ISCHEMIA OF MUSCLE, INITIAL ENCOUNTER: ICD-10-CM

## 2018-05-23 ENCOUNTER — OUTPATIENT (OUTPATIENT)
Dept: OUTPATIENT SERVICES | Facility: HOSPITAL | Age: 83
LOS: 1 days | Discharge: HOME | End: 2018-05-23

## 2018-05-23 DIAGNOSIS — D64.9 ANEMIA, UNSPECIFIED: ICD-10-CM

## 2018-06-05 ENCOUNTER — OUTPATIENT (OUTPATIENT)
Dept: OUTPATIENT SERVICES | Facility: HOSPITAL | Age: 83
LOS: 1 days | Discharge: HOME | End: 2018-06-05

## 2018-06-05 DIAGNOSIS — R79.9 ABNORMAL FINDING OF BLOOD CHEMISTRY, UNSPECIFIED: ICD-10-CM

## 2019-10-03 NOTE — BEHAVIORAL HEALTH ASSESSMENT NOTE - ADULT OR CHILD PROTECTIVE SERVICES INVOLVEMENT
[Awake] : awake [Alert] : alert [Acute Distress] : no acute distress [LAD] : no lymphadenopathy [Thyroid Nodule] : no thyroid nodule [Mass] : no breast mass [Goiter] : no goiter [Nipple Discharge] : no nipple discharge [Axillary LAD] : no axillary lymphadenopathy [Soft] : soft [Tender] : non tender [Labia Majora] : labia major [Labia Minora] : labia minora [Uterine Adnexae] : were not tender and not enlarged [Normal] : clitoris No

## 2019-12-16 NOTE — PATIENT PROFILE ADULT. - FUNCTIONAL SCREEN CURRENT LEVEL: TOILETING, MLM
----- Message from Kingsley Goel MD sent at 12/14/2019  7:29 PM CST -----  Please call celiac serologies are suggestive of celiac disease.      Please arrange egd for pt,    Pt should NOT start gluten free diet until egd has been completed.      thx   (2) assistive person

## 2021-06-29 NOTE — BEHAVIORAL HEALTH ASSESSMENT NOTE - FAMILY HISTORY OF PSYCHIATRIC ILLNESS / SUICIDALITY
Chart reviewed. Noted pt lives at assisted living and plan is for discharge back to facility on hospice. Pt does not have any skilled OT needs. Will discharge OT.    None known

## 2024-02-15 NOTE — ED ADULT TRIAGE NOTE - BP NONINVASIVE SYSTOLIC (MM HG)
66
atorvastatin 20 mg oral tablet: 1 tab(s) orally once a day (at bedtime)  clopidogrel 75 mg oral tablet: 1 tab(s) orally once a day  Eliquis 5 mg oral tablet: 1 tab(s) orally 2 times a day  LORazepam 2 mg oral tablet: 1 tab(s) orally 2 times a day as needed for  anxiety  oxyCODONE 10 mg oral tablet: 1 tab(s) orally 4 times a day as needed for  severe pain

## 2024-06-24 NOTE — PROGRESS NOTE ADULT - ASSESSMENT
Group Topic:  Process Group     Date: 6/24/2024  Start Time: 11:00 AM  End Time: 11:50 AM  Facilitators: Sandy Alvarez LCSW    Focus: Recovery  Number in attendance: 9    Patients were given the opportunity to share individually about goals, current stressors and therapeutic assignments. Group and therapist feedback is welcomed and maladaptive skills are challenged with coping options.     Sandy Alvarez LCSW      Method: Group  Attendance: Present Pt was called out of group to meet with provider for 30 minutes.   Participation: Moderate  Patient Response: Attentive    Pt was not able to process due to meeting with provider.      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 Net fluid balance: +3.8 L    CARDIOVASCULAR:  - On Neosynephrine @ 0.5  - Keep SBP > 100, c/w Lopressor 12.5 mg BID for rate-control  - Cardio: CHADSVASC 2, no AC is recommended. IV Digoxin maybe a better choice in terms of rhythm control than Amiodarone. Stabilize, with improvement in urine output, prior to adjustment to beta-blocker. Will need cardiac risk stratification for procedure.   - Spoke with PCP Dr. Marcial who noted PMHx of mild LV dysfunction and mid CAD. Last cath in 2013, no stents. Was being seen for hypertension without any known comorbidities  - TSH 2.10, Trop uptrending 0.01 --> 0.02 --> 0.03     GI:  - c/w PO Protonix 40 mg QD    RENAL:  - c/w LR @ 100 cc/hr for rhabdomyolysis   - Continue to trend CPK   - Strict IOs, Montemayor reinserted due to urinary retention  - Holding ACE-I due to nephrotoxicity    INFECTIOUS DISEASE:  - Asymptomatic bacteruria: No need for ABx   - f/u UCx (E coli, > 100K), BCx    HEMATOLOGICAL:  - Elevated INR 1.34  - Iron deficiency anemia  - Reactive leukocytosis; follow-up pancultures    ENDOCRINE:  - OP follow-up for multinodular thyroid  - f/u AM Cortisol    MUSCULOSKELETAL:  - Ortho: No compartment syndrome noted. R Hip ORIF when stable. Pelvis, R Hip, R Femur XR   - Family to decide if they want any operative management  - Pain Control for L 9th rib fracture: Morphine 2 mg Q4H PRN, Oxycodone IR 10 mg Q4H PRN  - Activity: NWB RLE   - DVT ppx: Switch to therapeutic Lovenox 60 mg BID    CODE STATUS: DNR/DNI    DISPOSITION: Possibly rehab/SNF when stable no